# Patient Record
Sex: FEMALE | Race: WHITE | NOT HISPANIC OR LATINO
[De-identification: names, ages, dates, MRNs, and addresses within clinical notes are randomized per-mention and may not be internally consistent; named-entity substitution may affect disease eponyms.]

---

## 2021-08-02 ENCOUNTER — NON-APPOINTMENT (OUTPATIENT)
Age: 31
End: 2021-08-02

## 2021-08-31 ENCOUNTER — APPOINTMENT (OUTPATIENT)
Dept: OBGYN | Facility: CLINIC | Age: 31
End: 2021-08-31
Payer: COMMERCIAL

## 2021-08-31 VITALS
BODY MASS INDEX: 23.04 KG/M2 | SYSTOLIC BLOOD PRESSURE: 90 MMHG | DIASTOLIC BLOOD PRESSURE: 60 MMHG | WEIGHT: 130 LBS | HEIGHT: 63 IN

## 2021-08-31 DIAGNOSIS — Z83.3 FAMILY HISTORY OF DIABETES MELLITUS: ICD-10-CM

## 2021-08-31 DIAGNOSIS — Z00.00 ENCOUNTER FOR GENERAL ADULT MEDICAL EXAMINATION W/OUT ABNORMAL FINDINGS: ICD-10-CM

## 2021-08-31 DIAGNOSIS — Z80.49 FAMILY HISTORY OF MALIGNANT NEOPLASM OF OTHER GENITAL ORGANS: ICD-10-CM

## 2021-08-31 DIAGNOSIS — Z78.9 OTHER SPECIFIED HEALTH STATUS: ICD-10-CM

## 2021-08-31 PROCEDURE — 99213 OFFICE O/P EST LOW 20 MIN: CPT | Mod: 25

## 2021-08-31 PROCEDURE — 99385 PREV VISIT NEW AGE 18-39: CPT | Mod: 25

## 2021-08-31 PROCEDURE — 76830 TRANSVAGINAL US NON-OB: CPT

## 2021-09-02 LAB
C TRACH RRNA SPEC QL NAA+PROBE: NOT DETECTED
N GONORRHOEA RRNA SPEC QL NAA+PROBE: NOT DETECTED
SOURCE TP AMPLIFICATION: NORMAL

## 2021-09-07 LAB — CYTOLOGY CVX/VAG DOC THIN PREP: NORMAL

## 2021-09-20 ENCOUNTER — APPOINTMENT (OUTPATIENT)
Dept: OBGYN | Facility: CLINIC | Age: 31
End: 2021-09-20

## 2021-09-21 ENCOUNTER — TRANSCRIPTION ENCOUNTER (OUTPATIENT)
Age: 31
End: 2021-09-21

## 2021-09-21 ENCOUNTER — ASOB RESULT (OUTPATIENT)
Age: 31
End: 2021-09-21

## 2021-09-21 ENCOUNTER — APPOINTMENT (OUTPATIENT)
Dept: ANTEPARTUM | Facility: CLINIC | Age: 31
End: 2021-09-21
Payer: COMMERCIAL

## 2021-09-21 PROCEDURE — 36416 COLLJ CAPILLARY BLOOD SPEC: CPT

## 2021-09-21 PROCEDURE — 76813 OB US NUCHAL MEAS 1 GEST: CPT

## 2021-09-21 PROCEDURE — 76801 OB US < 14 WKS SINGLE FETUS: CPT

## 2021-09-23 LAB
BASOPHILS # BLD AUTO: 0.01 K/UL
BASOPHILS NFR BLD AUTO: 0.1 %
EOSINOPHIL # BLD AUTO: 0.04 K/UL
EOSINOPHIL NFR BLD AUTO: 0.6 %
HCT VFR BLD CALC: 35.9 %
HGB BLD-MCNC: 12 G/DL
IMM GRANULOCYTES NFR BLD AUTO: 0.4 %
LYMPHOCYTES # BLD AUTO: 1.67 K/UL
LYMPHOCYTES NFR BLD AUTO: 24.1 %
MAN DIFF?: NORMAL
MCHC RBC-ENTMCNC: 27.8 PG
MCHC RBC-ENTMCNC: 33.4 GM/DL
MCV RBC AUTO: 83.3 FL
MONOCYTES # BLD AUTO: 0.42 K/UL
MONOCYTES NFR BLD AUTO: 6.1 %
NEUTROPHILS # BLD AUTO: 4.76 K/UL
NEUTROPHILS NFR BLD AUTO: 68.7 %
PLATELET # BLD AUTO: 221 K/UL
RBC # BLD: 4.31 M/UL
RBC # FLD: 12.5 %
TSH SERPL-ACNC: 1.28 UIU/ML
WBC # FLD AUTO: 6.93 K/UL

## 2021-09-24 LAB
B19V IGG SER QL IA: 7.96 INDEX
B19V IGG+IGM SER-IMP: NORMAL
B19V IGG+IGM SER-IMP: POSITIVE
B19V IGM FLD-ACNC: 0.25 INDEX
B19V IGM SER-ACNC: NEGATIVE
GESTATIONAL GLUCOSE 1 HOUR (ADA): 102 MG/DL
GESTATIONAL GLUCOSE 2 HOUR (ADA): 86 MG/DL
GESTATIONAL GLUCOSE FASTING (ADA): 74 MG/DL
HBV SURFACE AG SER QL: NONREACTIVE
HGB A MFR BLD: 97.1 %
HGB A2 MFR BLD: 2.9 %
HGB FRACT BLD-IMP: NORMAL
HIV1+2 AB SPEC QL IA.RAPID: NONREACTIVE
MEV IGG FLD QL IA: 5.1 AU/ML
MEV IGG+IGM SER-IMP: NEGATIVE
RUBV IGG FLD-ACNC: 2.1 INDEX
RUBV IGG SER-IMP: POSITIVE
T GONDII AB SER-IMP: NEGATIVE
T GONDII AB SER-IMP: NEGATIVE
T GONDII IGG SER QL: <3 IU/ML
T GONDII IGM SER QL: <3 AU/ML
VZV AB TITR SER: NEGATIVE
VZV IGG SER IF-ACNC: 126.2 INDEX

## 2021-09-27 PROBLEM — Z80.49 FAMILY HISTORY OF MALIGNANT NEOPLASM OF ENDOMETRIUM: Status: ACTIVE | Noted: 2021-09-27

## 2021-09-27 PROBLEM — Z78.9 SOCIAL ALCOHOL USE: Status: ACTIVE | Noted: 2021-09-27

## 2021-09-27 PROBLEM — Z83.3 FAMILY HISTORY OF DIABETES MELLITUS: Status: ACTIVE | Noted: 2021-09-27

## 2021-09-28 LAB — T PALLIDUM AB SER QL IA: NEGATIVE

## 2021-09-30 LAB
AR GENE MUT ANL BLD/T: NORMAL
FMR1 GENE MUT ANL BLD/T: NORMAL

## 2021-10-04 ENCOUNTER — APPOINTMENT (OUTPATIENT)
Dept: OBGYN | Facility: CLINIC | Age: 31
End: 2021-10-04
Payer: COMMERCIAL

## 2021-10-04 VITALS
DIASTOLIC BLOOD PRESSURE: 66 MMHG | WEIGHT: 127 LBS | SYSTOLIC BLOOD PRESSURE: 110 MMHG | HEIGHT: 63 IN | BODY MASS INDEX: 22.5 KG/M2

## 2021-10-04 LAB — CFTR MUT TESTED BLD/T: NEGATIVE

## 2021-10-04 PROCEDURE — 90656 IIV3 VACC NO PRSV 0.5 ML IM: CPT

## 2021-10-04 PROCEDURE — 0502F SUBSEQUENT PRENATAL CARE: CPT

## 2021-10-04 PROCEDURE — 90471 IMMUNIZATION ADMIN: CPT

## 2021-10-05 LAB
BILIRUB UR QL STRIP: NORMAL
GLUCOSE UR-MCNC: NORMAL
HCG UR QL: 0.2 EU/DL
HGB UR QL STRIP.AUTO: NORMAL
KETONES UR-MCNC: NORMAL
LEUKOCYTE ESTERASE UR QL STRIP: NORMAL
NITRITE UR QL STRIP: NORMAL
PH UR STRIP: 6
PROT UR STRIP-MCNC: NORMAL
SP GR UR STRIP: 1.01

## 2021-10-11 LAB — ABO + RH PNL BLD: NORMAL

## 2021-11-01 ENCOUNTER — APPOINTMENT (OUTPATIENT)
Dept: OBGYN | Facility: CLINIC | Age: 31
End: 2021-11-01
Payer: COMMERCIAL

## 2021-11-01 VITALS
HEIGHT: 63 IN | DIASTOLIC BLOOD PRESSURE: 56 MMHG | WEIGHT: 127 LBS | SYSTOLIC BLOOD PRESSURE: 100 MMHG | BODY MASS INDEX: 22.5 KG/M2

## 2021-11-01 PROCEDURE — 0502F SUBSEQUENT PRENATAL CARE: CPT

## 2021-11-01 PROCEDURE — 36415 COLL VENOUS BLD VENIPUNCTURE: CPT

## 2021-11-02 LAB
BILIRUB UR QL STRIP: NORMAL
BLD GP AB SCN SERPL QL: NORMAL
GLUCOSE UR-MCNC: NORMAL
HCG UR QL: 0.2 EU/DL
HGB UR QL STRIP.AUTO: NORMAL
KETONES UR-MCNC: NORMAL
LEUKOCYTE ESTERASE UR QL STRIP: NORMAL
NITRITE UR QL STRIP: NORMAL
PH UR STRIP: 6.5
PROT UR STRIP-MCNC: NORMAL
SP GR UR STRIP: 1.01

## 2021-11-11 LAB
2ND TRIMESTER DATA: NORMAL
ADDENDUM DOC: NORMAL
AFP PNL SERPL: NORMAL
AFP SERPL-ACNC: NORMAL
CLINICAL BIOCHEMIST REVIEW: NORMAL
Lab: NORMAL
NOTES NTD: NORMAL

## 2021-11-16 ENCOUNTER — APPOINTMENT (OUTPATIENT)
Dept: ANTEPARTUM | Facility: CLINIC | Age: 31
End: 2021-11-16
Payer: COMMERCIAL

## 2021-11-16 ENCOUNTER — ASOB RESULT (OUTPATIENT)
Age: 31
End: 2021-11-16

## 2021-11-16 PROCEDURE — 76811 OB US DETAILED SNGL FETUS: CPT

## 2021-11-16 PROCEDURE — 99212 OFFICE O/P EST SF 10 MIN: CPT | Mod: 25

## 2021-11-22 ENCOUNTER — APPOINTMENT (OUTPATIENT)
Dept: PEDIATRIC CARDIOLOGY | Facility: CLINIC | Age: 31
End: 2021-11-22
Payer: COMMERCIAL

## 2021-11-22 PROCEDURE — 99202 OFFICE O/P NEW SF 15 MIN: CPT

## 2021-11-22 PROCEDURE — 76827 ECHO EXAM OF FETAL HEART: CPT

## 2021-11-22 PROCEDURE — 76825 ECHO EXAM OF FETAL HEART: CPT

## 2021-11-22 PROCEDURE — 93325 DOPPLER ECHO COLOR FLOW MAPG: CPT

## 2021-11-29 ENCOUNTER — APPOINTMENT (OUTPATIENT)
Dept: OBGYN | Facility: CLINIC | Age: 31
End: 2021-11-29
Payer: COMMERCIAL

## 2021-11-29 VITALS
HEIGHT: 63 IN | BODY MASS INDEX: 24.1 KG/M2 | SYSTOLIC BLOOD PRESSURE: 100 MMHG | WEIGHT: 136 LBS | DIASTOLIC BLOOD PRESSURE: 60 MMHG

## 2021-11-29 LAB
BILIRUB UR QL STRIP: NORMAL
GLUCOSE UR-MCNC: NORMAL
HCG UR QL: 0.2 EU/DL
HGB UR QL STRIP.AUTO: NORMAL
KETONES UR-MCNC: NORMAL
LEUKOCYTE ESTERASE UR QL STRIP: NORMAL
NITRITE UR QL STRIP: NORMAL
PH UR STRIP: 6.5
PROT UR STRIP-MCNC: NORMAL
SP GR UR STRIP: 1.01

## 2021-11-29 PROCEDURE — 0502F SUBSEQUENT PRENATAL CARE: CPT

## 2021-12-27 ENCOUNTER — APPOINTMENT (OUTPATIENT)
Dept: OBGYN | Facility: CLINIC | Age: 31
End: 2021-12-27
Payer: COMMERCIAL

## 2021-12-27 VITALS
BODY MASS INDEX: 24.8 KG/M2 | WEIGHT: 140 LBS | SYSTOLIC BLOOD PRESSURE: 100 MMHG | DIASTOLIC BLOOD PRESSURE: 56 MMHG | HEIGHT: 63 IN

## 2021-12-27 PROCEDURE — 0502F SUBSEQUENT PRENATAL CARE: CPT

## 2022-01-11 ENCOUNTER — APPOINTMENT (OUTPATIENT)
Dept: ANTEPARTUM | Facility: CLINIC | Age: 32
End: 2022-01-11
Payer: COMMERCIAL

## 2022-01-11 ENCOUNTER — ASOB RESULT (OUTPATIENT)
Age: 32
End: 2022-01-11

## 2022-01-11 PROCEDURE — 76817 TRANSVAGINAL US OBSTETRIC: CPT

## 2022-01-11 PROCEDURE — 76816 OB US FOLLOW-UP PER FETUS: CPT

## 2022-01-11 PROCEDURE — 76819 FETAL BIOPHYS PROFIL W/O NST: CPT

## 2022-01-13 LAB
BASOPHILS # BLD AUTO: 0.03 K/UL
BASOPHILS NFR BLD AUTO: 0.2 %
EOSINOPHIL # BLD AUTO: 0.13 K/UL
EOSINOPHIL NFR BLD AUTO: 1.1 %
HCT VFR BLD CALC: 35.3 %
HGB BLD-MCNC: 11.1 G/DL
IMM GRANULOCYTES NFR BLD AUTO: 1.6 %
LYMPHOCYTES # BLD AUTO: 1.92 K/UL
LYMPHOCYTES NFR BLD AUTO: 16 %
MAN DIFF?: NORMAL
MCHC RBC-ENTMCNC: 29.8 PG
MCHC RBC-ENTMCNC: 31.4 GM/DL
MCV RBC AUTO: 94.6 FL
MONOCYTES # BLD AUTO: 1.05 K/UL
MONOCYTES NFR BLD AUTO: 8.7 %
NEUTROPHILS # BLD AUTO: 8.71 K/UL
NEUTROPHILS NFR BLD AUTO: 72.4 %
PLATELET # BLD AUTO: 187 K/UL
RBC # BLD: 3.73 M/UL
RBC # FLD: 13 %
WBC # FLD AUTO: 12.03 K/UL

## 2022-01-16 LAB
GESTATIONAL GLUCOSE 1 HOUR (ADA): 140 MG/DL
GESTATIONAL GLUCOSE 2 HOUR (ADA): 133 MG/DL
GESTATIONAL GLUCOSE FASTING (ADA): 71 MG/DL

## 2022-01-24 ENCOUNTER — APPOINTMENT (OUTPATIENT)
Dept: OBGYN | Facility: CLINIC | Age: 32
End: 2022-01-24
Payer: COMMERCIAL

## 2022-01-24 VITALS
WEIGHT: 149 LBS | SYSTOLIC BLOOD PRESSURE: 110 MMHG | DIASTOLIC BLOOD PRESSURE: 64 MMHG | BODY MASS INDEX: 26.4 KG/M2 | HEIGHT: 63 IN

## 2022-01-24 PROCEDURE — 0502F SUBSEQUENT PRENATAL CARE: CPT

## 2022-01-25 LAB
BILIRUB UR QL STRIP: NORMAL
GLUCOSE UR-MCNC: NORMAL
HCG UR QL: 0.2 EU/DL
HGB UR QL STRIP.AUTO: NORMAL
KETONES UR-MCNC: NORMAL
LEUKOCYTE ESTERASE UR QL STRIP: NORMAL
NITRITE UR QL STRIP: NORMAL
PH UR STRIP: 6
PROT UR STRIP-MCNC: NORMAL
SP GR UR STRIP: 1.03

## 2022-02-07 ENCOUNTER — APPOINTMENT (OUTPATIENT)
Dept: OBGYN | Facility: CLINIC | Age: 32
End: 2022-02-07
Payer: COMMERCIAL

## 2022-02-07 VITALS
SYSTOLIC BLOOD PRESSURE: 116 MMHG | DIASTOLIC BLOOD PRESSURE: 64 MMHG | BODY MASS INDEX: 27.29 KG/M2 | WEIGHT: 154 LBS | HEIGHT: 63 IN

## 2022-02-07 PROCEDURE — 0502F SUBSEQUENT PRENATAL CARE: CPT

## 2022-02-08 ENCOUNTER — ASOB RESULT (OUTPATIENT)
Age: 32
End: 2022-02-08

## 2022-02-08 ENCOUNTER — APPOINTMENT (OUTPATIENT)
Dept: ANTEPARTUM | Facility: CLINIC | Age: 32
End: 2022-02-08
Payer: COMMERCIAL

## 2022-02-08 PROCEDURE — 76817 TRANSVAGINAL US OBSTETRIC: CPT

## 2022-02-08 PROCEDURE — 76819 FETAL BIOPHYS PROFIL W/O NST: CPT

## 2022-02-08 PROCEDURE — 76816 OB US FOLLOW-UP PER FETUS: CPT

## 2022-02-28 ENCOUNTER — APPOINTMENT (OUTPATIENT)
Dept: OBGYN | Facility: CLINIC | Age: 32
End: 2022-02-28
Payer: COMMERCIAL

## 2022-02-28 VITALS
HEIGHT: 63 IN | BODY MASS INDEX: 29.23 KG/M2 | DIASTOLIC BLOOD PRESSURE: 80 MMHG | WEIGHT: 165 LBS | SYSTOLIC BLOOD PRESSURE: 120 MMHG

## 2022-02-28 PROCEDURE — 0502F SUBSEQUENT PRENATAL CARE: CPT

## 2022-03-01 ENCOUNTER — APPOINTMENT (OUTPATIENT)
Dept: ANTEPARTUM | Facility: CLINIC | Age: 32
End: 2022-03-01
Payer: COMMERCIAL

## 2022-03-01 ENCOUNTER — ASOB RESULT (OUTPATIENT)
Age: 32
End: 2022-03-01

## 2022-03-01 PROCEDURE — 76817 TRANSVAGINAL US OBSTETRIC: CPT

## 2022-03-01 PROCEDURE — 76816 OB US FOLLOW-UP PER FETUS: CPT

## 2022-03-01 PROCEDURE — 76819 FETAL BIOPHYS PROFIL W/O NST: CPT

## 2022-03-06 LAB
BILIRUB UR QL STRIP: NORMAL
GLUCOSE UR-MCNC: NORMAL
HCG UR QL: 0.2 EU/DL
HGB UR QL STRIP.AUTO: NORMAL
KETONES UR-MCNC: NORMAL
LEUKOCYTE ESTERASE UR QL STRIP: NORMAL
NITRITE UR QL STRIP: NORMAL
PH UR STRIP: 6
PROT UR STRIP-MCNC: NORMAL
SP GR UR STRIP: 1.02

## 2022-03-14 ENCOUNTER — LABORATORY RESULT (OUTPATIENT)
Age: 32
End: 2022-03-14

## 2022-03-14 ENCOUNTER — APPOINTMENT (OUTPATIENT)
Dept: OBGYN | Facility: CLINIC | Age: 32
End: 2022-03-14
Payer: COMMERCIAL

## 2022-03-14 VITALS
BODY MASS INDEX: 29.77 KG/M2 | HEIGHT: 63 IN | WEIGHT: 168 LBS | SYSTOLIC BLOOD PRESSURE: 130 MMHG | DIASTOLIC BLOOD PRESSURE: 80 MMHG

## 2022-03-14 DIAGNOSIS — Z23 ENCOUNTER FOR IMMUNIZATION: ICD-10-CM

## 2022-03-14 PROCEDURE — 90471 IMMUNIZATION ADMIN: CPT

## 2022-03-14 PROCEDURE — 90715 TDAP VACCINE 7 YRS/> IM: CPT

## 2022-03-14 PROCEDURE — 0502F SUBSEQUENT PRENATAL CARE: CPT

## 2022-03-15 ENCOUNTER — APPOINTMENT (OUTPATIENT)
Dept: OBGYN | Facility: CLINIC | Age: 32
End: 2022-03-15
Payer: COMMERCIAL

## 2022-03-15 PROCEDURE — 0502F SUBSEQUENT PRENATAL CARE: CPT

## 2022-03-15 PROCEDURE — 36415 COLL VENOUS BLD VENIPUNCTURE: CPT

## 2022-03-15 RX ORDER — BLOOD-GLUCOSE METER
KIT MISCELLANEOUS
Qty: 1 | Refills: 0 | Status: ACTIVE | COMMUNITY
Start: 2022-03-15 | End: 1900-01-01

## 2022-03-16 ENCOUNTER — INPATIENT (INPATIENT)
Facility: HOSPITAL | Age: 32
LOS: 5 days | Discharge: HOME | End: 2022-03-22
Attending: OBSTETRICS & GYNECOLOGY | Admitting: OBSTETRICS & GYNECOLOGY
Payer: COMMERCIAL

## 2022-03-16 VITALS
SYSTOLIC BLOOD PRESSURE: 143 MMHG | HEART RATE: 89 BPM | DIASTOLIC BLOOD PRESSURE: 92 MMHG | RESPIRATION RATE: 16 BRPM | TEMPERATURE: 99 F

## 2022-03-16 DIAGNOSIS — Z98.890 OTHER SPECIFIED POSTPROCEDURAL STATES: Chronic | ICD-10-CM

## 2022-03-16 DIAGNOSIS — O16.9 UNSPECIFIED MATERNAL HYPERTENSION, UNSPECIFIED TRIMESTER: ICD-10-CM

## 2022-03-16 LAB
ALBUMIN SERPL ELPH-MCNC: 3 G/DL — LOW (ref 3.3–5)
ALP SERPL-CCNC: 189 U/L — HIGH (ref 40–120)
ALT FLD-CCNC: 23 U/L — SIGNIFICANT CHANGE UP (ref 4–33)
ANION GAP SERPL CALC-SCNC: 12 MMOL/L — SIGNIFICANT CHANGE UP (ref 7–14)
APPEARANCE UR: ABNORMAL
APTT BLD: 25.7 SEC — LOW (ref 27–36.3)
AST SERPL-CCNC: 30 U/L — SIGNIFICANT CHANGE UP (ref 4–32)
BACTERIA # UR AUTO: ABNORMAL
BASOPHILS # BLD AUTO: 0.02 K/UL — SIGNIFICANT CHANGE UP (ref 0–0.2)
BASOPHILS NFR BLD AUTO: 0.2 % — SIGNIFICANT CHANGE UP (ref 0–2)
BILIRUB SERPL-MCNC: <0.2 MG/DL — SIGNIFICANT CHANGE UP (ref 0.2–1.2)
BILIRUB UR-MCNC: NEGATIVE — SIGNIFICANT CHANGE UP
BLD GP AB SCN SERPL QL: NEGATIVE — SIGNIFICANT CHANGE UP
BUN SERPL-MCNC: 15 MG/DL — SIGNIFICANT CHANGE UP (ref 7–23)
CALCIUM SERPL-MCNC: 9.5 MG/DL — SIGNIFICANT CHANGE UP (ref 8.4–10.5)
CHLORIDE SERPL-SCNC: 108 MMOL/L — HIGH (ref 98–107)
CO2 SERPL-SCNC: 19 MMOL/L — LOW (ref 22–31)
COLOR SPEC: SIGNIFICANT CHANGE UP
CREAT ?TM UR-MCNC: 59 MG/DL — SIGNIFICANT CHANGE UP
CREAT SERPL-MCNC: 0.74 MG/DL — SIGNIFICANT CHANGE UP (ref 0.5–1.3)
DIFF PNL FLD: ABNORMAL
EGFR: 111 ML/MIN/1.73M2 — SIGNIFICANT CHANGE UP
EOSINOPHIL # BLD AUTO: 0.28 K/UL — SIGNIFICANT CHANGE UP (ref 0–0.5)
EOSINOPHIL NFR BLD AUTO: 3.2 % — SIGNIFICANT CHANGE UP (ref 0–6)
EPI CELLS # UR: 2 /HPF — SIGNIFICANT CHANGE UP (ref 0–5)
FIBRINOGEN PPP-MCNC: 562 MG/DL — HIGH (ref 330–520)
GLUCOSE SERPL-MCNC: 79 MG/DL — SIGNIFICANT CHANGE UP (ref 70–99)
GLUCOSE UR QL: NEGATIVE — SIGNIFICANT CHANGE UP
HCT VFR BLD CALC: 30.4 % — LOW (ref 34.5–45)
HGB BLD-MCNC: 9.8 G/DL — LOW (ref 11.5–15.5)
HYALINE CASTS # UR AUTO: 2 /LPF — SIGNIFICANT CHANGE UP (ref 0–7)
IANC: 6.44 K/UL — SIGNIFICANT CHANGE UP (ref 1.5–8.5)
IMM GRANULOCYTES NFR BLD AUTO: 0.9 % — SIGNIFICANT CHANGE UP (ref 0–1.5)
INR BLD: 0.92 RATIO — SIGNIFICANT CHANGE UP (ref 0.88–1.16)
KETONES UR-MCNC: NEGATIVE — SIGNIFICANT CHANGE UP
LDH SERPL L TO P-CCNC: 223 U/L — SIGNIFICANT CHANGE UP (ref 135–225)
LEUKOCYTE ESTERASE UR-ACNC: NEGATIVE — SIGNIFICANT CHANGE UP
LYMPHOCYTES # BLD AUTO: 1.26 K/UL — SIGNIFICANT CHANGE UP (ref 1–3.3)
LYMPHOCYTES # BLD AUTO: 14.2 % — SIGNIFICANT CHANGE UP (ref 13–44)
MCHC RBC-ENTMCNC: 28.1 PG — SIGNIFICANT CHANGE UP (ref 27–34)
MCHC RBC-ENTMCNC: 32.2 GM/DL — SIGNIFICANT CHANGE UP (ref 32–36)
MCV RBC AUTO: 87.1 FL — SIGNIFICANT CHANGE UP (ref 80–100)
MONOCYTES # BLD AUTO: 0.77 K/UL — SIGNIFICANT CHANGE UP (ref 0–0.9)
MONOCYTES NFR BLD AUTO: 8.7 % — SIGNIFICANT CHANGE UP (ref 2–14)
NEUTROPHILS # BLD AUTO: 6.44 K/UL — SIGNIFICANT CHANGE UP (ref 1.8–7.4)
NEUTROPHILS NFR BLD AUTO: 72.8 % — SIGNIFICANT CHANGE UP (ref 43–77)
NITRITE UR-MCNC: NEGATIVE — SIGNIFICANT CHANGE UP
NRBC # BLD: 0 /100 WBCS — SIGNIFICANT CHANGE UP
NRBC # FLD: 0.02 K/UL — HIGH
PH UR: 6 — SIGNIFICANT CHANGE UP (ref 5–8)
PLATELET # BLD AUTO: 143 K/UL — LOW (ref 150–400)
POTASSIUM SERPL-MCNC: 4.3 MMOL/L — SIGNIFICANT CHANGE UP (ref 3.5–5.3)
POTASSIUM SERPL-SCNC: 4.3 MMOL/L — SIGNIFICANT CHANGE UP (ref 3.5–5.3)
PROT ?TM UR-MCNC: 93 MG/DL — SIGNIFICANT CHANGE UP
PROT ?TM UR-MCNC: 93 MG/DL — SIGNIFICANT CHANGE UP
PROT SERPL-MCNC: 5.7 G/DL — LOW (ref 6–8.3)
PROT UR-MCNC: ABNORMAL
PROT/CREAT UR-RTO: 1.6 RATIO — HIGH (ref 0–0.2)
PROTHROM AB SERPL-ACNC: 10.7 SEC — SIGNIFICANT CHANGE UP (ref 10.5–13.4)
RBC # BLD: 3.49 M/UL — LOW (ref 3.8–5.2)
RBC # FLD: 12.1 % — SIGNIFICANT CHANGE UP (ref 10.3–14.5)
RBC CASTS # UR COMP ASSIST: 3 /HPF — SIGNIFICANT CHANGE UP (ref 0–4)
RH IG SCN BLD-IMP: POSITIVE — SIGNIFICANT CHANGE UP
RH IG SCN BLD-IMP: POSITIVE — SIGNIFICANT CHANGE UP
SODIUM SERPL-SCNC: 139 MMOL/L — SIGNIFICANT CHANGE UP (ref 135–145)
SP GR SPEC: 1.01 — SIGNIFICANT CHANGE UP (ref 1–1.05)
URATE SERPL-MCNC: 8.3 MG/DL — HIGH (ref 2.5–7)
UROBILINOGEN FLD QL: SIGNIFICANT CHANGE UP
WBC # BLD: 8.85 K/UL — SIGNIFICANT CHANGE UP (ref 3.8–10.5)
WBC # FLD AUTO: 8.85 K/UL — SIGNIFICANT CHANGE UP (ref 3.8–10.5)
WBC UR QL: 21 /HPF — HIGH (ref 0–5)

## 2022-03-16 RX ORDER — OXYTOCIN 10 UNIT/ML
VIAL (ML) INJECTION
Qty: 20 | Refills: 0 | Status: DISCONTINUED | OUTPATIENT
Start: 2022-03-16 | End: 2022-03-17

## 2022-03-16 RX ORDER — SODIUM CHLORIDE 9 MG/ML
1000 INJECTION, SOLUTION INTRAVENOUS
Refills: 0 | Status: DISCONTINUED | OUTPATIENT
Start: 2022-03-16 | End: 2022-03-17

## 2022-03-16 NOTE — OB PROVIDER H&P - ASSESSMENT
Evidence of gestational hypertension, discussed findings with Dr. Salas  -Admit to L&D  -Routine and HELLP labs   -Induce with PO Cytotec

## 2022-03-16 NOTE — OB RN PATIENT PROFILE - PAIN RATING/NUMBER SCALE (0-10): REST
----- Message from Trang Bradley sent at 2020  9:44 AM CDT -----  [address]  Tavares Zimmer  1953  HOME: [phone]   WORK: N/A   CELL: [phone]       Patient is scheduled for Labs 20  Lab orders needed: NEED LAB ORDERS    Please insure lab orders will be available by the date of service.         0

## 2022-03-16 NOTE — OB RN PATIENT PROFILE - FALL HARM RISK - UNIVERSAL INTERVENTIONS
Bed in lowest position, wheels locked, appropriate side rails in place/Call bell, personal items and telephone in reach/Instruct patient to call for assistance before getting out of bed or chair/Non-slip footwear when patient is out of bed/[place] to call system/Physically safe environment - no spills, clutter or unnecessary equipment/Purposeful Proactive Rounding/Room/bathroom lighting operational, light cord in reach

## 2022-03-16 NOTE — OB RN TRIAGE NOTE - NSMATERNALFETALCONCERNS_OBGYN_ALL_OB_FT
MATERNAL FETAL ALERT  Posterior placenta is PREVIA 11/16/2021  minimal pericardial effusion on fetal echo   Kellie Nicholson RN 11/26/2021

## 2022-03-16 NOTE — OB PROVIDER H&P - HISTORY OF PRESENT ILLNESS
Pt. is a 32y/o EGA 37.2wks reports of elevated blood pressures yesterday (157/93 and 143/89) and today (139/95 and 149/96). Pt. denies headache, visual changes, epigastric/RUQ pain, N/V, abdominal cramping, leakage of fluid and vaginal bleeding. Pt. reports of good fetal movement.     AP: Low lying placenta. 2.8cm away from the internal os - Resolved (3/1/22). Mild pericardial effusion - Resolved   Medical Hx: Denies  Surgical HX: Two ganglion cyst removal 2002 and 2006  OBGYN HX: Denies  Meds: PNV  NKDA Pt. is a 32y/o EGA 37.2wks reports of elevated blood pressures yesterday (157/93 and 143/89) and today (139/95 and 149/96). Pt. denies headache, visual changes, epigastric/RUQ pain, N/V, abdominal cramping, leakage of fluid and vaginal bleeding. Pt. reports of good fetal movement.     COVID Pos. 2/8/22    AP: Low lying placenta. 2.8cm away from the internal os - Resolved (3/1/22). Mild pericardial effusion - Resolved   Medical Hx: Denies  Surgical HX: Two ganglion cyst removal 2002 and 2006  OBGYN HX: Denies  Meds: PNV  NKDA

## 2022-03-16 NOTE — OB PROVIDER H&P - NSHPPHYSICALEXAM_GEN_ALL_CORE
Abdomen soft nontender  SVE: 0/0/-3  TAS: Cephalic presentation  GBS: Neg. (3/14/22)  TAS: Cephalic presentation   EFW: 3000gms by leopolds   FHR: 145bpm, moderate variability, accels, no decels  Rose irregularly

## 2022-03-16 NOTE — OB RN TRIAGE NOTE - FALL HARM RISK - UNIVERSAL INTERVENTIONS
Bed in lowest position, wheels locked, appropriate side rails in place/Call bell, personal items and telephone in reach/Instruct patient to call for assistance before getting out of bed or chair/Non-slip footwear when patient is out of bed/[place] to call system/Physically safe environment - no spills, clutter or unnecessary equipment/Purposeful Proactive Rounding/Room/bathroom lighting operational, light cord in reach

## 2022-03-17 ENCOUNTER — TRANSCRIPTION ENCOUNTER (OUTPATIENT)
Age: 32
End: 2022-03-17

## 2022-03-17 LAB
ALBUMIN SERPL ELPH-MCNC: 2.9 G/DL — LOW (ref 3.3–5)
ALP SERPL-CCNC: 187 U/L — HIGH (ref 40–120)
ALT FLD-CCNC: 25 U/L — SIGNIFICANT CHANGE UP (ref 4–33)
ANION GAP SERPL CALC-SCNC: 12 MMOL/L — SIGNIFICANT CHANGE UP (ref 7–14)
APTT BLD: 25.1 SEC — LOW (ref 27–36.3)
APTT BLD: 25.3 SEC — LOW (ref 27–36.3)
AST SERPL-CCNC: 27 U/L — SIGNIFICANT CHANGE UP (ref 4–32)
BASOPHILS # BLD AUTO: 0.03 K/UL — SIGNIFICANT CHANGE UP (ref 0–0.2)
BASOPHILS # BLD AUTO: 0.03 K/UL — SIGNIFICANT CHANGE UP (ref 0–0.2)
BASOPHILS NFR BLD AUTO: 0.3 % — SIGNIFICANT CHANGE UP (ref 0–2)
BASOPHILS NFR BLD AUTO: 0.4 % — SIGNIFICANT CHANGE UP (ref 0–2)
BILIRUB SERPL-MCNC: <0.2 MG/DL — SIGNIFICANT CHANGE UP (ref 0.2–1.2)
BUN SERPL-MCNC: 15 MG/DL — SIGNIFICANT CHANGE UP (ref 7–23)
CALCIUM SERPL-MCNC: 8.8 MG/DL — SIGNIFICANT CHANGE UP (ref 8.4–10.5)
CHLORIDE SERPL-SCNC: 106 MMOL/L — SIGNIFICANT CHANGE UP (ref 98–107)
CO2 SERPL-SCNC: 18 MMOL/L — LOW (ref 22–31)
COVID-19 SPIKE DOMAIN AB INTERP: POSITIVE
COVID-19 SPIKE DOMAIN ANTIBODY RESULT: >250 U/ML — HIGH
CREAT SERPL-MCNC: 0.81 MG/DL — SIGNIFICANT CHANGE UP (ref 0.5–1.3)
EGFR: 99 ML/MIN/1.73M2 — SIGNIFICANT CHANGE UP
EOSINOPHIL # BLD AUTO: 0.19 K/UL — SIGNIFICANT CHANGE UP (ref 0–0.5)
EOSINOPHIL # BLD AUTO: 0.32 K/UL — SIGNIFICANT CHANGE UP (ref 0–0.5)
EOSINOPHIL NFR BLD AUTO: 1.7 % — SIGNIFICANT CHANGE UP (ref 0–6)
EOSINOPHIL NFR BLD AUTO: 3.9 % — SIGNIFICANT CHANGE UP (ref 0–6)
GLUCOSE SERPL-MCNC: 72 MG/DL — SIGNIFICANT CHANGE UP (ref 70–99)
HCT VFR BLD CALC: 30 % — LOW (ref 34.5–45)
HCT VFR BLD CALC: 30.2 % — LOW (ref 34.5–45)
HGB BLD-MCNC: 10.1 G/DL — LOW (ref 11.5–15.5)
HGB BLD-MCNC: 9.8 G/DL — LOW (ref 11.5–15.5)
IANC: 5.83 K/UL — SIGNIFICANT CHANGE UP (ref 1.5–8.5)
IANC: 8.72 K/UL — HIGH (ref 1.5–8.5)
IMM GRANULOCYTES NFR BLD AUTO: 0.9 % — SIGNIFICANT CHANGE UP (ref 0–1.5)
IMM GRANULOCYTES NFR BLD AUTO: 0.9 % — SIGNIFICANT CHANGE UP (ref 0–1.5)
LDH SERPL L TO P-CCNC: 202 U/L — SIGNIFICANT CHANGE UP (ref 135–225)
LYMPHOCYTES # BLD AUTO: 1.34 K/UL — SIGNIFICANT CHANGE UP (ref 1–3.3)
LYMPHOCYTES # BLD AUTO: 1.4 K/UL — SIGNIFICANT CHANGE UP (ref 1–3.3)
LYMPHOCYTES # BLD AUTO: 12.2 % — LOW (ref 13–44)
LYMPHOCYTES # BLD AUTO: 16.4 % — SIGNIFICANT CHANGE UP (ref 13–44)
MCHC RBC-ENTMCNC: 28 PG — SIGNIFICANT CHANGE UP (ref 27–34)
MCHC RBC-ENTMCNC: 28.8 PG — SIGNIFICANT CHANGE UP (ref 27–34)
MCHC RBC-ENTMCNC: 32.5 GM/DL — SIGNIFICANT CHANGE UP (ref 32–36)
MCHC RBC-ENTMCNC: 33.7 GM/DL — SIGNIFICANT CHANGE UP (ref 32–36)
MCV RBC AUTO: 85.5 FL — SIGNIFICANT CHANGE UP (ref 80–100)
MCV RBC AUTO: 86.3 FL — SIGNIFICANT CHANGE UP (ref 80–100)
MONOCYTES # BLD AUTO: 0.6 K/UL — SIGNIFICANT CHANGE UP (ref 0–0.9)
MONOCYTES # BLD AUTO: 1.05 K/UL — HIGH (ref 0–0.9)
MONOCYTES NFR BLD AUTO: 7.3 % — SIGNIFICANT CHANGE UP (ref 2–14)
MONOCYTES NFR BLD AUTO: 9.1 % — SIGNIFICANT CHANGE UP (ref 2–14)
NEUTROPHILS # BLD AUTO: 5.83 K/UL — SIGNIFICANT CHANGE UP (ref 1.8–7.4)
NEUTROPHILS # BLD AUTO: 8.72 K/UL — HIGH (ref 1.8–7.4)
NEUTROPHILS NFR BLD AUTO: 71.1 % — SIGNIFICANT CHANGE UP (ref 43–77)
NEUTROPHILS NFR BLD AUTO: 75.8 % — SIGNIFICANT CHANGE UP (ref 43–77)
NRBC # BLD: 0 /100 WBCS — SIGNIFICANT CHANGE UP
NRBC # BLD: 0 /100 WBCS — SIGNIFICANT CHANGE UP
NRBC # FLD: 0 K/UL — SIGNIFICANT CHANGE UP
NRBC # FLD: 0 K/UL — SIGNIFICANT CHANGE UP
PLATELET # BLD AUTO: 113 K/UL — LOW (ref 150–400)
PLATELET # BLD AUTO: 120 K/UL — LOW (ref 150–400)
POTASSIUM SERPL-MCNC: 4.4 MMOL/L — SIGNIFICANT CHANGE UP (ref 3.5–5.3)
POTASSIUM SERPL-SCNC: 4.4 MMOL/L — SIGNIFICANT CHANGE UP (ref 3.5–5.3)
PROT SERPL-MCNC: 5.4 G/DL — LOW (ref 6–8.3)
RBC # BLD: 3.5 M/UL — LOW (ref 3.8–5.2)
RBC # BLD: 3.51 M/UL — LOW (ref 3.8–5.2)
RBC # FLD: 12.2 % — SIGNIFICANT CHANGE UP (ref 10.3–14.5)
RBC # FLD: 12.3 % — SIGNIFICANT CHANGE UP (ref 10.3–14.5)
SARS-COV-2 IGG+IGM SERPL QL IA: >250 U/ML — HIGH
SARS-COV-2 IGG+IGM SERPL QL IA: POSITIVE
SODIUM SERPL-SCNC: 136 MMOL/L — SIGNIFICANT CHANGE UP (ref 135–145)
T PALLIDUM AB TITR SER: NEGATIVE — SIGNIFICANT CHANGE UP
URATE SERPL-MCNC: 8.3 MG/DL — HIGH (ref 2.5–7)
URATE SERPL-MCNC: 8.6 MG/DL — HIGH (ref 2.5–7)
WBC # BLD: 11.49 K/UL — HIGH (ref 3.8–10.5)
WBC # BLD: 8.19 K/UL — SIGNIFICANT CHANGE UP (ref 3.8–10.5)
WBC # FLD AUTO: 11.49 K/UL — HIGH (ref 3.8–10.5)
WBC # FLD AUTO: 8.19 K/UL — SIGNIFICANT CHANGE UP (ref 3.8–10.5)

## 2022-03-17 RX ORDER — OXYTOCIN 10 UNIT/ML
VIAL (ML) INJECTION
Qty: 30 | Refills: 0 | Status: DISCONTINUED | OUTPATIENT
Start: 2022-03-17 | End: 2022-03-18

## 2022-03-17 RX ORDER — OXYTOCIN 10 UNIT/ML
333.33 VIAL (ML) INJECTION
Qty: 20 | Refills: 0 | Status: DISCONTINUED | OUTPATIENT
Start: 2022-03-17 | End: 2022-03-22

## 2022-03-17 RX ORDER — SODIUM CHLORIDE 9 MG/ML
1000 INJECTION, SOLUTION INTRAVENOUS
Refills: 0 | Status: DISCONTINUED | OUTPATIENT
Start: 2022-03-17 | End: 2022-03-18

## 2022-03-17 RX ADMIN — SODIUM CHLORIDE 125 MILLILITER(S): 9 INJECTION, SOLUTION INTRAVENOUS at 19:21

## 2022-03-17 RX ADMIN — Medication 2 MILLIUNIT(S)/MIN: at 22:44

## 2022-03-17 NOTE — OB PROVIDER LABOR PROGRESS NOTE - ASSESSMENT
Cervical change noted  Cervical balloon discussed with patient; patient declining at this time, will reevaluate after next dose of cytotec  Continue with PO cytotec  Will reassess PRN    d/w MD Joey Flynn NP

## 2022-03-17 NOTE — OB PROVIDER LABOR PROGRESS NOTE - ASSESSMENT
Cervical change noted  Cervical balloon attempted, patient unable to tolerate at this time  Will reassess and attempt cervical ami Flynn NP Cervical change noted  Cervical balloon attempted, patient unable to tolerate at this time  Continue PO cytotec  Will reassess and attempt cervical ballon at a later time  MD Joey Flynn NP

## 2022-03-18 LAB
ALBUMIN SERPL ELPH-MCNC: 2.2 G/DL — LOW (ref 3.3–5)
ALBUMIN SERPL ELPH-MCNC: 2.5 G/DL — LOW (ref 3.3–5)
ALP SERPL-CCNC: 155 U/L — HIGH (ref 40–120)
ALP SERPL-CCNC: 180 U/L — HIGH (ref 40–120)
ALT FLD-CCNC: 18 U/L — SIGNIFICANT CHANGE UP (ref 4–33)
ALT FLD-CCNC: 22 U/L — SIGNIFICANT CHANGE UP (ref 4–33)
ANION GAP SERPL CALC-SCNC: 10 MMOL/L — SIGNIFICANT CHANGE UP (ref 7–14)
ANION GAP SERPL CALC-SCNC: 10 MMOL/L — SIGNIFICANT CHANGE UP (ref 7–14)
APTT BLD: 25.9 SEC — LOW (ref 27–36.3)
APTT BLD: 28.5 SEC — SIGNIFICANT CHANGE UP (ref 27–36.3)
AST SERPL-CCNC: 27 U/L — SIGNIFICANT CHANGE UP (ref 4–32)
AST SERPL-CCNC: 31 U/L — SIGNIFICANT CHANGE UP (ref 4–32)
BASOPHILS # BLD AUTO: 0.03 K/UL — SIGNIFICANT CHANGE UP (ref 0–0.2)
BASOPHILS # BLD AUTO: 0.03 K/UL — SIGNIFICANT CHANGE UP (ref 0–0.2)
BASOPHILS NFR BLD AUTO: 0.1 % — SIGNIFICANT CHANGE UP (ref 0–2)
BASOPHILS NFR BLD AUTO: 0.2 % — SIGNIFICANT CHANGE UP (ref 0–2)
BILIRUB SERPL-MCNC: 0.4 MG/DL — SIGNIFICANT CHANGE UP (ref 0.2–1.2)
BILIRUB SERPL-MCNC: 0.5 MG/DL — SIGNIFICANT CHANGE UP (ref 0.2–1.2)
BUN SERPL-MCNC: 14 MG/DL — SIGNIFICANT CHANGE UP (ref 7–23)
BUN SERPL-MCNC: 14 MG/DL — SIGNIFICANT CHANGE UP (ref 7–23)
CALCIUM SERPL-MCNC: 7.6 MG/DL — LOW (ref 8.4–10.5)
CALCIUM SERPL-MCNC: 8.2 MG/DL — LOW (ref 8.4–10.5)
CHLORIDE SERPL-SCNC: 104 MMOL/L — SIGNIFICANT CHANGE UP (ref 98–107)
CHLORIDE SERPL-SCNC: 104 MMOL/L — SIGNIFICANT CHANGE UP (ref 98–107)
CO2 SERPL-SCNC: 19 MMOL/L — LOW (ref 22–31)
CO2 SERPL-SCNC: 19 MMOL/L — LOW (ref 22–31)
CREAT SERPL-MCNC: 1.03 MG/DL — SIGNIFICANT CHANGE UP (ref 0.5–1.3)
CREAT SERPL-MCNC: 1.05 MG/DL — SIGNIFICANT CHANGE UP (ref 0.5–1.3)
EGFR: 73 ML/MIN/1.73M2 — SIGNIFICANT CHANGE UP
EGFR: 75 ML/MIN/1.73M2 — SIGNIFICANT CHANGE UP
EOSINOPHIL # BLD AUTO: 0.02 K/UL — SIGNIFICANT CHANGE UP (ref 0–0.5)
EOSINOPHIL # BLD AUTO: 0.05 K/UL — SIGNIFICANT CHANGE UP (ref 0–0.5)
EOSINOPHIL NFR BLD AUTO: 0.1 % — SIGNIFICANT CHANGE UP (ref 0–6)
EOSINOPHIL NFR BLD AUTO: 0.3 % — SIGNIFICANT CHANGE UP (ref 0–6)
FIBRINOGEN PPP-MCNC: 633 MG/DL — HIGH (ref 330–520)
GLUCOSE SERPL-MCNC: 121 MG/DL — HIGH (ref 70–99)
GLUCOSE SERPL-MCNC: 77 MG/DL — SIGNIFICANT CHANGE UP (ref 70–99)
HCT VFR BLD CALC: 26.3 % — LOW (ref 34.5–45)
HCT VFR BLD CALC: 28.4 % — LOW (ref 34.5–45)
HGB BLD-MCNC: 8.9 G/DL — LOW (ref 11.5–15.5)
HGB BLD-MCNC: 9.4 G/DL — LOW (ref 11.5–15.5)
IANC: 13.4 K/UL — HIGH (ref 1.5–8.5)
IANC: 22.12 K/UL — HIGH (ref 1.5–8.5)
IMM GRANULOCYTES NFR BLD AUTO: 1.2 % — SIGNIFICANT CHANGE UP (ref 0–1.5)
IMM GRANULOCYTES NFR BLD AUTO: 1.5 % — SIGNIFICANT CHANGE UP (ref 0–1.5)
INR BLD: 1.03 RATIO — SIGNIFICANT CHANGE UP (ref 0.88–1.16)
INR BLD: 1.04 RATIO — SIGNIFICANT CHANGE UP (ref 0.88–1.16)
LDH SERPL L TO P-CCNC: 318 U/L — HIGH (ref 135–225)
LYMPHOCYTES # BLD AUTO: 0.79 K/UL — LOW (ref 1–3.3)
LYMPHOCYTES # BLD AUTO: 1.32 K/UL — SIGNIFICANT CHANGE UP (ref 1–3.3)
LYMPHOCYTES # BLD AUTO: 5.1 % — LOW (ref 13–44)
LYMPHOCYTES # BLD AUTO: 5.3 % — LOW (ref 13–44)
MCHC RBC-ENTMCNC: 29 PG — SIGNIFICANT CHANGE UP (ref 27–34)
MCHC RBC-ENTMCNC: 29 PG — SIGNIFICANT CHANGE UP (ref 27–34)
MCHC RBC-ENTMCNC: 33.1 GM/DL — SIGNIFICANT CHANGE UP (ref 32–36)
MCHC RBC-ENTMCNC: 33.8 GM/DL — SIGNIFICANT CHANGE UP (ref 32–36)
MCV RBC AUTO: 85.7 FL — SIGNIFICANT CHANGE UP (ref 80–100)
MCV RBC AUTO: 87.7 FL — SIGNIFICANT CHANGE UP (ref 80–100)
MONOCYTES # BLD AUTO: 1.04 K/UL — HIGH (ref 0–0.9)
MONOCYTES # BLD AUTO: 1.33 K/UL — HIGH (ref 0–0.9)
MONOCYTES NFR BLD AUTO: 5.3 % — SIGNIFICANT CHANGE UP (ref 2–14)
MONOCYTES NFR BLD AUTO: 6.7 % — SIGNIFICANT CHANGE UP (ref 2–14)
NEUTROPHILS # BLD AUTO: 13.4 K/UL — HIGH (ref 1.8–7.4)
NEUTROPHILS # BLD AUTO: 22.12 K/UL — HIGH (ref 1.8–7.4)
NEUTROPHILS NFR BLD AUTO: 86.2 % — HIGH (ref 43–77)
NEUTROPHILS NFR BLD AUTO: 88 % — HIGH (ref 43–77)
NRBC # BLD: 0 /100 WBCS — SIGNIFICANT CHANGE UP
NRBC # BLD: 0 /100 WBCS — SIGNIFICANT CHANGE UP
NRBC # FLD: 0.02 K/UL — HIGH
NRBC # FLD: 0.03 K/UL — HIGH
PLATELET # BLD AUTO: 113 K/UL — LOW (ref 150–400)
PLATELET # BLD AUTO: 116 K/UL — LOW (ref 150–400)
POTASSIUM SERPL-MCNC: 4.7 MMOL/L — SIGNIFICANT CHANGE UP (ref 3.5–5.3)
POTASSIUM SERPL-MCNC: 4.7 MMOL/L — SIGNIFICANT CHANGE UP (ref 3.5–5.3)
POTASSIUM SERPL-SCNC: 4.7 MMOL/L — SIGNIFICANT CHANGE UP (ref 3.5–5.3)
POTASSIUM SERPL-SCNC: 4.7 MMOL/L — SIGNIFICANT CHANGE UP (ref 3.5–5.3)
PROT SERPL-MCNC: 4.8 G/DL — LOW (ref 6–8.3)
PROT SERPL-MCNC: 5.1 G/DL — LOW (ref 6–8.3)
PROTHROM AB SERPL-ACNC: 11.9 SEC — SIGNIFICANT CHANGE UP (ref 10.5–13.4)
PROTHROM AB SERPL-ACNC: 12.1 SEC — SIGNIFICANT CHANGE UP (ref 10.5–13.4)
RBC # BLD: 3.07 M/UL — LOW (ref 3.8–5.2)
RBC # BLD: 3.24 M/UL — LOW (ref 3.8–5.2)
RBC # FLD: 12.2 % — SIGNIFICANT CHANGE UP (ref 10.3–14.5)
RBC # FLD: 12.2 % — SIGNIFICANT CHANGE UP (ref 10.3–14.5)
SODIUM SERPL-SCNC: 133 MMOL/L — LOW (ref 135–145)
SODIUM SERPL-SCNC: 133 MMOL/L — LOW (ref 135–145)
URATE SERPL-MCNC: 7.3 MG/DL — HIGH (ref 2.5–7)
URATE SERPL-MCNC: 8.3 MG/DL — HIGH (ref 2.5–7)
WBC # BLD: 15.55 K/UL — HIGH (ref 3.8–10.5)
WBC # BLD: 25.12 K/UL — HIGH (ref 3.8–10.5)
WBC # FLD AUTO: 15.55 K/UL — HIGH (ref 3.8–10.5)
WBC # FLD AUTO: 25.12 K/UL — HIGH (ref 3.8–10.5)

## 2022-03-18 PROCEDURE — 59510 CESAREAN DELIVERY: CPT

## 2022-03-18 RX ORDER — GENTAMICIN SULFATE 40 MG/ML
380 VIAL (ML) INJECTION ONCE
Refills: 0 | Status: DISCONTINUED | OUTPATIENT
Start: 2022-03-18 | End: 2022-03-18

## 2022-03-18 RX ORDER — CITRIC ACID/SODIUM CITRATE 300-500 MG
30 SOLUTION, ORAL ORAL ONCE
Refills: 0 | Status: DISCONTINUED | OUTPATIENT
Start: 2022-03-18 | End: 2022-03-18

## 2022-03-18 RX ORDER — MAGNESIUM HYDROXIDE 400 MG/1
30 TABLET, CHEWABLE ORAL
Refills: 0 | Status: DISCONTINUED | OUTPATIENT
Start: 2022-03-18 | End: 2022-03-22

## 2022-03-18 RX ORDER — LANOLIN
1 OINTMENT (GRAM) TOPICAL EVERY 6 HOURS
Refills: 0 | Status: DISCONTINUED | OUTPATIENT
Start: 2022-03-18 | End: 2022-03-22

## 2022-03-18 RX ORDER — SODIUM CHLORIDE 9 MG/ML
1000 INJECTION, SOLUTION INTRAVENOUS
Refills: 0 | Status: DISCONTINUED | OUTPATIENT
Start: 2022-03-18 | End: 2022-03-18

## 2022-03-18 RX ORDER — SODIUM CHLORIDE 9 MG/ML
1000 INJECTION, SOLUTION INTRAVENOUS ONCE
Refills: 0 | Status: DISCONTINUED | OUTPATIENT
Start: 2022-03-18 | End: 2022-03-18

## 2022-03-18 RX ORDER — KETOROLAC TROMETHAMINE 30 MG/ML
30 SYRINGE (ML) INJECTION EVERY 6 HOURS
Refills: 0 | Status: COMPLETED | OUTPATIENT
Start: 2022-03-18 | End: 2022-03-19

## 2022-03-18 RX ORDER — TETANUS TOXOID, REDUCED DIPHTHERIA TOXOID AND ACELLULAR PERTUSSIS VACCINE, ADSORBED 5; 2.5; 8; 8; 2.5 [IU]/.5ML; [IU]/.5ML; UG/.5ML; UG/.5ML; UG/.5ML
0.5 SUSPENSION INTRAMUSCULAR ONCE
Refills: 0 | Status: DISCONTINUED | OUTPATIENT
Start: 2022-03-18 | End: 2022-03-22

## 2022-03-18 RX ORDER — GENTAMICIN SULFATE 40 MG/ML
310 VIAL (ML) INJECTION EVERY 24 HOURS
Refills: 0 | Status: COMPLETED | OUTPATIENT
Start: 2022-03-18 | End: 2022-03-19

## 2022-03-18 RX ORDER — OXYCODONE HYDROCHLORIDE 5 MG/1
5 TABLET ORAL
Refills: 0 | Status: COMPLETED | OUTPATIENT
Start: 2022-03-18 | End: 2022-03-25

## 2022-03-18 RX ORDER — ACETAMINOPHEN 500 MG
975 TABLET ORAL ONCE
Refills: 0 | Status: DISCONTINUED | OUTPATIENT
Start: 2022-03-18 | End: 2022-03-18

## 2022-03-18 RX ORDER — OXYTOCIN 10 UNIT/ML
VIAL (ML) INJECTION
Qty: 30 | Refills: 0 | Status: DISCONTINUED | OUTPATIENT
Start: 2022-03-18 | End: 2022-03-18

## 2022-03-18 RX ORDER — SODIUM CHLORIDE 9 MG/ML
1000 INJECTION, SOLUTION INTRAVENOUS
Refills: 0 | Status: DISCONTINUED | OUTPATIENT
Start: 2022-03-18 | End: 2022-03-22

## 2022-03-18 RX ORDER — DIPHENHYDRAMINE HCL 50 MG
25 CAPSULE ORAL EVERY 6 HOURS
Refills: 0 | Status: DISCONTINUED | OUTPATIENT
Start: 2022-03-18 | End: 2022-03-22

## 2022-03-18 RX ORDER — OXYCODONE HYDROCHLORIDE 5 MG/1
5 TABLET ORAL ONCE
Refills: 0 | Status: DISCONTINUED | OUTPATIENT
Start: 2022-03-18 | End: 2022-03-22

## 2022-03-18 RX ORDER — SIMETHICONE 80 MG/1
80 TABLET, CHEWABLE ORAL EVERY 4 HOURS
Refills: 0 | Status: DISCONTINUED | OUTPATIENT
Start: 2022-03-18 | End: 2022-03-22

## 2022-03-18 RX ORDER — OXYTOCIN 10 UNIT/ML
333.33 VIAL (ML) INJECTION
Qty: 20 | Refills: 0 | Status: COMPLETED | OUTPATIENT
Start: 2022-03-18 | End: 2022-03-18

## 2022-03-18 RX ORDER — AMPICILLIN TRIHYDRATE 250 MG
2 CAPSULE ORAL ONCE
Refills: 0 | Status: DISCONTINUED | OUTPATIENT
Start: 2022-03-18 | End: 2022-03-18

## 2022-03-18 RX ORDER — FAMOTIDINE 10 MG/ML
20 INJECTION INTRAVENOUS ONCE
Refills: 0 | Status: DISCONTINUED | OUTPATIENT
Start: 2022-03-18 | End: 2022-03-18

## 2022-03-18 RX ORDER — OXYTOCIN 10 UNIT/ML
333.33 VIAL (ML) INJECTION
Qty: 20 | Refills: 0 | Status: DISCONTINUED | OUTPATIENT
Start: 2022-03-18 | End: 2022-03-22

## 2022-03-18 RX ORDER — ACETAMINOPHEN 500 MG
975 TABLET ORAL
Refills: 0 | Status: DISCONTINUED | OUTPATIENT
Start: 2022-03-18 | End: 2022-03-22

## 2022-03-18 RX ORDER — IBUPROFEN 200 MG
600 TABLET ORAL EVERY 6 HOURS
Refills: 0 | Status: COMPLETED | OUTPATIENT
Start: 2022-03-18 | End: 2023-02-14

## 2022-03-18 RX ORDER — HEPARIN SODIUM 5000 [USP'U]/ML
5000 INJECTION INTRAVENOUS; SUBCUTANEOUS EVERY 12 HOURS
Refills: 0 | Status: DISCONTINUED | OUTPATIENT
Start: 2022-03-18 | End: 2022-03-22

## 2022-03-18 RX ADMIN — Medication 2 MILLIUNIT(S)/MIN: at 08:32

## 2022-03-18 RX ADMIN — Medication 975 MILLIGRAM(S): at 20:38

## 2022-03-18 RX ADMIN — SODIUM CHLORIDE 125 MILLILITER(S): 9 INJECTION, SOLUTION INTRAVENOUS at 08:32

## 2022-03-18 RX ADMIN — Medication 30 MILLILITER(S): at 17:57

## 2022-03-18 RX ADMIN — Medication 100 MILLIGRAM(S): at 17:51

## 2022-03-18 RX ADMIN — Medication 1000 MILLIUNIT(S)/MIN: at 17:54

## 2022-03-18 RX ADMIN — FAMOTIDINE 20 MILLIGRAM(S): 10 INJECTION INTRAVENOUS at 15:10

## 2022-03-18 RX ADMIN — Medication 500 MILLIGRAM(S): at 18:57

## 2022-03-18 NOTE — OB PROVIDER LABOR PROGRESS NOTE - ASSESSMENT
No cervical change noted  AROM from 0240-clear fluid noted currently  IUPC placed  Pitocin to continue; currently @16mu/hr  Fetal tachycardia noted; IV bolus initiated  Patient afebrile 36.6-oral   Approved for epidural redose  Will reassess PRN    d/w MD Samia Flynn NP

## 2022-03-18 NOTE — OB PROVIDER LABOR PROGRESS NOTE - NS_OBIHIFHRDETAILS_OBGYN_ALL_OB_FT
155 moderate variability, accelerations present, no decelerations
145 moderate/+accels/-decels
155 moderate/+accels/+decels
140 moderate variability/ +accels/-decels
160 moderate variability/+accels/+decels
cat 1 tracing

## 2022-03-18 NOTE — OB PROVIDER LABOR PROGRESS NOTE - ASSESSMENT
IUPC replaced, exam unchanged.    -  by the bedside  - Lactated Ringer bolus in progress   - IVH 150cc changed to 250cc/hr

## 2022-03-18 NOTE — OB PROVIDER DELIVERY SUMMARY - NSSELHIDDEN_OBGYN_ALL_OB_FT
[NS_DeliveryAttending1_OBGYN_ALL_OB_FT:[id number]=],[NS_DeliveryAssist1_OBGYN_ALL_OB_FT:[id number]=],[NS_DeliveryRN_OBGYN_ALL_OB_FT:[id number]=]

## 2022-03-18 NOTE — OB RN DELIVERY SUMMARY - NSSELHIDDEN_OBGYN_ALL_OB_FT
[NS_DeliveryAttending1_OBGYN_ALL_OB_FT:[id number]=],[NS_DeliveryAssist1_OBGYN_ALL_OB_FT:[id number]=],[NS_DeliveryRN_OBGYN_ALL_OB_FT:[id number]=]

## 2022-03-18 NOTE — OB PROVIDER LABOR PROGRESS NOTE - NS_SUBJECTIVE/OBJECTIVE_OBGYN_ALL_OB_FT
IUPC to be readjusted/replaced    Vital Signs Last 24 Hrs  T(C): 36.8 (18 Mar 2022 08:30), Max: 37.0 (17 Mar 2022 19:58)  T(F): 98.24 (18 Mar 2022 08:30), Max: 98.6 (17 Mar 2022 19:58)  HR: 85 (18 Mar 2022 10:10) (71 - 109)  BP: 115/66 (18 Mar 2022 10:10) (107/63 - 153/90)  SpO2: 98% (18 Mar 2022 10:10) (87% - 100%)
Patient seen and examined for category II tracing. Patient comfortable with epidural.  BP: 140/84  HR:84  Temp: 38.5C
CB placed
Patient seen and evaluated for placement of cervical balloon. No complaints at this time.
Patient seen and examined for vaginal exam
Patient seen and examined for placement of IUPC. Patient c/o contraction pain/rectal pressure.  VS  T(C): 36.8 (03-18-22 @ 08:30)  HR: 85 (03-18-22 @ 09:28)  BP: 127/73 (03-18-22 @ 09:28)  SpO2: 93% (03-18-22 @ 09:28)

## 2022-03-18 NOTE — OB RN DELIVERY SUMMARY - AMNIOTIC FLUID AMOUNT, LABOR
Continue torsemide 10mg daily and one potassium  Same dose of Lisinopril    Labs on 10-8 when you see Dr. Conde    See me back in  
within normal limits

## 2022-03-18 NOTE — OB NEONATOLOGY/PEDIATRICIAN DELIVERY SUMMARY - NSMATERNALFETALCONCERNS_OBGYN_ALL_OB_FT
needs device
MATERNAL FETAL ALERT  Posterior placenta is PREVIA 11/16/2021  minimal pericardial effusion on fetal echo   Kellie Nicholson RN 11/26/2021

## 2022-03-18 NOTE — CHART NOTE - NSCHARTNOTEFT_GEN_A_CORE
Pt comfortable  NST categ 1  ve 4/80/-3  induction for gHTN  balloon removed  consider AROM when head descends

## 2022-03-18 NOTE — PACU DISCHARGE NOTE - COMMENTS
T(C): 36.5 (03-18-22 @ 20:30), Max: 38.5 (03-18-22 @ 14:46)  HR: 68 (03-18-22 @ 22:30) (67 - 109)  BP: 137/85 (03-18-22 @ 22:30) (115/66 - 153/90)  RR: 16 (03-18-22 @ 22:30) (16 - 22)  SpO2: 98% (03-18-22 @ 22:30) (79% - 100%)    Vital signs stable. Pain and nausea are controlled. Appropriate strength and sensation in bilateral lower extremities. Signs of neuraxial complications to be aware of were discussed with the patient, to which she expressed understanding. All questions answered. Stable for transfer to the floor.

## 2022-03-18 NOTE — OB PROVIDER DELIVERY SUMMARY - NSPROVIDERDELIVERYNOTE_OBGYN_ALL_OB_FT
viable female infant, OP presentation, weight 2909g, APGARS 9/9  grossly normal uters, b/l tubes and ovaries  hysterotomy closed in 1 layer  peritoneum and rectus muscle closed in an interrupted fashion  Patient spiked an intrapartum temp for which patient received ampicillin and gentamicin, in the operating room the patient received azithromycin and ancef, which would have covered ampicillin, patient to receive a dose of clindamycin postpartum as well as an extra dose of ampicillin, and an extra dose of gentamicin    402/1500/325    Dictation #: viable female infant, OP presentation, weight 2909g, APGARS 9/9  grossly normal uters, b/l tubes and ovaries  hysterotomy closed in 1 layer  peritoneum and rectus muscle closed in an interrupted fashion  Patient spiked an intrapartum temp for which patient received ampicillin and gentamicin, in the operating room the patient received azithromycin and ancef, which would have covered ampicillin, patient to receive a dose of clindamycin postpartum as well as an extra dose of ampicillin, and an extra dose of gentamicin    402/1500/325      Dictation #: [id number]

## 2022-03-18 NOTE — CHART NOTE - NSCHARTNOTEFT_GEN_A_CORE
2025    House officer at the bedside given pt spontaneous desaturation in the PACU. Anesthesiologist also at the bedside discussing recommendation for ICS. Patient denies any chest pain, shortness of breath, or any other complaints    Gen: No acute distress. Awake. Alert  CV: Regular rate and rhythm. No murmurs appreciated  Pulm: Clear to auscultation bilaterally. No respiratory distress. No wheezes, rales, or rhonchi  - SaO2 noted to spontaneously desaturate to 88% with improvement to the high 90s with use of incentive spirometer   Abd: Soft. Non-tender. Pfannensteil incision site w/ overlying bandage   : White in place   Extremities: No calf tenderness bilaterally. SCDs in place     A/P: Pt POD#0 w/ post-op hypoxia c/f post-op atelectasis  - Will continue to monitor for improvement in oxygenation with persistent use of ICS  - Continuous pulse ox for the floor  - Consider supplemental O2 if saturation does not improve with persistent use of ICS    Ji Jaime  PGY-1, Obstetrics & Gynecology 2025    House officer at the bedside given pt spontaneous desaturation in the PACU. Anesthesiologist also at the bedside discussing recommendation for ICS. Patient denies any chest pain, shortness of breath, or any other complaints. Denies any hx of asthma, KAPIL, or respiratory issues     Gen: No acute distress. Awake. Alert  CV: Regular rate and rhythm. No murmurs appreciated  Pulm: Clear to auscultation bilaterally. No respiratory distress. No wheezes, rales, or rhonchi  - SaO2 noted to spontaneously desaturate to 88% with improvement to the high 90s with use of incentive spirometer   Abd: Soft. Non-tender. Pfannensteil incision site w/ overlying bandage   : White in place   Extremities: No calf tenderness bilaterally. SCDs in place     A/P: Pt POD#0 w/ post-op hypoxia c/f post-op atelectasis  - Will continue to monitor for improvement in oxygenation with persistent use of ICS  - Continuous pulse ox for the floor  - Consider supplemental O2 if saturation does not improve with persistent use of ICS    Ji Jaime  PGY-1, Obstetrics & Gynecology    d/w Dr. Crespo

## 2022-03-18 NOTE — OB RN DELIVERY SUMMARY - NS_SEPSISRSKCALC_OBGYN_ALL_OB_FT
EOS calculated successfully. EOS Risk Factor: 0.5/1000 live births ([facility] national incidence); GA=37w3d; Temp=101.3; ROM=13.183; GBS='Negative'; Antibiotics='Broad spectrum antibiotics 2-3.9 hrs prior to birth'

## 2022-03-18 NOTE — OB NEONATOLOGY/PEDIATRICIAN DELIVERY SUMMARY - NSPEDSNEONOTESA_OBGYN_ALL_OB_FT
Called by to attend primary c- section delivery due to category II tracing along with maternal fever. Baby is  product of a 37.3 week gestation born to a     31 year old female   Maternal labs include Blood Type A+ , HIV -, RPR - , Rubella Immune, Hep B[ - ], GBS - on 3/14 , rest is unremarkable. Maternal history is significant for gHTN. Pregnancy was complicated by posterior placenta previa resolved on 3/1/22 and mild pericardial effusion since resolved.   AROM at 02:41 with bloody fluids , approximately 13 hrs.  Resuscitation included: w/d/s/s .  Apgars were: 8/9 . EOS score 1.68. Admit to NICU for sepsis workup. Called by to attend primary c- section delivery due to category II tracing along with maternal fever. Baby is  product of a 37.3 week gestation born to a     31 year old female   Maternal labs include Blood Type A+ , HIV -, RPR - , Rubella Immune, Hep B[ - ], GBS - on 3/14 , rest is unremarkable. Maternal history is significant for gHTN. Pregnancy was complicated by posterior placenta previa resolved on 3/1/22 and mild pericardial effusion since resolved.   AROM at 02:41 with bloody fluids , approximately 13 hrs.  Resuscitation included: w/d/s/s .  Apgars were: 8/9 . EOS score 2.74. Admit to NICU for sepsis workup.

## 2022-03-18 NOTE — OB POSTPARTUM EVENT NOTE - NS_EVENTPTSUMMARY1_OBGYN_ALL_OB_FT
Pt oxygen saturation at 19:30 is 96. Upon taking oxygen off oxygen saturation at 20:00 is 90. BP for 20:00 is 151/88. Pulse is 81. RR is 21. Lungs are equal and clear bilaterally. Pt report no difficulty breathing, denies chest pain or shortness of breath. MD Jaime and anesthesia  paged to bedside for desaturation.

## 2022-03-18 NOTE — OB PROVIDER LABOR PROGRESS NOTE - ASSESSMENT
No cervical change noted, patient febrile at this time, rectal temperature-38.5 C  Patient s/p PO cyotec and cervical balloon  AROM-240am  Pitocin started at 1045am  Currently on high dose pitocin; at 26mu/hr with IUPC in place  Inadequate uterine contractions as per MVU  Pitocin d/c'd  Patient repositioned to left lateral with improvement of FHR tracing  Tylenol and antibiotics ordered for suspected chorio  Potential operative delivery discussed with patient due to failed induction/FHR tracing/chorio, MD Gracia to speak with patient and consent for operative delivery  Charge nurse aware of plan for operative delivery as per MD Samia Flynn NP

## 2022-03-18 NOTE — OB POSTPARTUM EVENT NOTE - NS_EVENTFINDINGS1_OBGYN_ALL_OB_FT
MD Arshad and MD Jaime at bedside evaluating pt. Pt to use incentive spirometry. Pt educated with show back. Pt verbalized understanding.

## 2022-03-18 NOTE — OB RN INTRAOPERATIVE NOTE - NSSELHIDDEN_OBGYN_ALL_OB_FT
[NS_DeliveryAttending1_OBGYN_ALL_OB_FT:[id number]],[NS_DeliveryAssist1_OBGYN_ALL_OB_FT:[id number]=],[NS_DeliveryRN_OBGYN_ALL_OB_FT:[id number]=]

## 2022-03-19 LAB
ALBUMIN SERPL ELPH-MCNC: 2 G/DL — LOW (ref 3.3–5)
ALBUMIN SERPL ELPH-MCNC: 2.2 G/DL — LOW (ref 3.3–5)
ALP SERPL-CCNC: 121 U/L — HIGH (ref 40–120)
ALP SERPL-CCNC: 139 U/L — HIGH (ref 40–120)
ALT FLD-CCNC: 16 U/L — SIGNIFICANT CHANGE UP (ref 4–33)
ALT FLD-CCNC: 17 U/L — SIGNIFICANT CHANGE UP (ref 4–33)
ANION GAP SERPL CALC-SCNC: 10 MMOL/L — SIGNIFICANT CHANGE UP (ref 7–14)
ANION GAP SERPL CALC-SCNC: 10 MMOL/L — SIGNIFICANT CHANGE UP (ref 7–14)
APTT BLD: 26.8 SEC — LOW (ref 27–36.3)
AST SERPL-CCNC: 28 U/L — SIGNIFICANT CHANGE UP (ref 4–32)
AST SERPL-CCNC: 29 U/L — SIGNIFICANT CHANGE UP (ref 4–32)
BASOPHILS # BLD AUTO: 0.04 K/UL — SIGNIFICANT CHANGE UP (ref 0–0.2)
BASOPHILS NFR BLD AUTO: 0.2 % — SIGNIFICANT CHANGE UP (ref 0–2)
BILIRUB SERPL-MCNC: 0.3 MG/DL — SIGNIFICANT CHANGE UP (ref 0.2–1.2)
BILIRUB SERPL-MCNC: <0.2 MG/DL — SIGNIFICANT CHANGE UP (ref 0.2–1.2)
BUN SERPL-MCNC: 14 MG/DL — SIGNIFICANT CHANGE UP (ref 7–23)
BUN SERPL-MCNC: 16 MG/DL — SIGNIFICANT CHANGE UP (ref 7–23)
CALCIUM SERPL-MCNC: 7.4 MG/DL — LOW (ref 8.4–10.5)
CALCIUM SERPL-MCNC: 7.6 MG/DL — LOW (ref 8.4–10.5)
CHLORIDE SERPL-SCNC: 100 MMOL/L — SIGNIFICANT CHANGE UP (ref 98–107)
CHLORIDE SERPL-SCNC: 97 MMOL/L — LOW (ref 98–107)
CO2 SERPL-SCNC: 19 MMOL/L — LOW (ref 22–31)
CO2 SERPL-SCNC: 21 MMOL/L — LOW (ref 22–31)
CREAT SERPL-MCNC: 1.1 MG/DL — SIGNIFICANT CHANGE UP (ref 0.5–1.3)
CREAT SERPL-MCNC: 1.14 MG/DL — SIGNIFICANT CHANGE UP (ref 0.5–1.3)
EGFR: 66 ML/MIN/1.73M2 — SIGNIFICANT CHANGE UP
EGFR: 69 ML/MIN/1.73M2 — SIGNIFICANT CHANGE UP
EOSINOPHIL # BLD AUTO: 0.01 K/UL — SIGNIFICANT CHANGE UP (ref 0–0.5)
EOSINOPHIL NFR BLD AUTO: 0 % — SIGNIFICANT CHANGE UP (ref 0–6)
FIBRINOGEN PPP-MCNC: 622 MG/DL — HIGH (ref 330–520)
GLUCOSE SERPL-MCNC: 89 MG/DL — SIGNIFICANT CHANGE UP (ref 70–99)
GLUCOSE SERPL-MCNC: 94 MG/DL — SIGNIFICANT CHANGE UP (ref 70–99)
HCT VFR BLD CALC: 23.8 % — LOW (ref 34.5–45)
HCT VFR BLD CALC: 25.2 % — LOW (ref 34.5–45)
HGB BLD-MCNC: 7.8 G/DL — LOW (ref 11.5–15.5)
HGB BLD-MCNC: 8.5 G/DL — LOW (ref 11.5–15.5)
IANC: 20.57 K/UL — HIGH (ref 1.5–8.5)
IMM GRANULOCYTES NFR BLD AUTO: 1 % — SIGNIFICANT CHANGE UP (ref 0–1.5)
INR BLD: 1.06 RATIO — SIGNIFICANT CHANGE UP (ref 0.88–1.16)
LDH SERPL L TO P-CCNC: 287 U/L — HIGH (ref 135–225)
LYMPHOCYTES # BLD AUTO: 1.24 K/UL — SIGNIFICANT CHANGE UP (ref 1–3.3)
LYMPHOCYTES # BLD AUTO: 5.2 % — LOW (ref 13–44)
MCHC RBC-ENTMCNC: 28 PG — SIGNIFICANT CHANGE UP (ref 27–34)
MCHC RBC-ENTMCNC: 28.4 PG — SIGNIFICANT CHANGE UP (ref 27–34)
MCHC RBC-ENTMCNC: 32.8 GM/DL — SIGNIFICANT CHANGE UP (ref 32–36)
MCHC RBC-ENTMCNC: 33.7 GM/DL — SIGNIFICANT CHANGE UP (ref 32–36)
MCV RBC AUTO: 84.3 FL — SIGNIFICANT CHANGE UP (ref 80–100)
MCV RBC AUTO: 85.3 FL — SIGNIFICANT CHANGE UP (ref 80–100)
MONOCYTES # BLD AUTO: 1.8 K/UL — HIGH (ref 0–0.9)
MONOCYTES NFR BLD AUTO: 7.5 % — SIGNIFICANT CHANGE UP (ref 2–14)
NEUTROPHILS # BLD AUTO: 20.57 K/UL — HIGH (ref 1.8–7.4)
NEUTROPHILS NFR BLD AUTO: 86.1 % — HIGH (ref 43–77)
NRBC # BLD: 0 /100 WBCS — SIGNIFICANT CHANGE UP
NRBC # BLD: 0 /100 WBCS — SIGNIFICANT CHANGE UP
NRBC # FLD: 0.02 K/UL — HIGH
NRBC # FLD: 0.02 K/UL — HIGH
PLATELET # BLD AUTO: 116 K/UL — LOW (ref 150–400)
PLATELET # BLD AUTO: 126 K/UL — LOW (ref 150–400)
POTASSIUM SERPL-MCNC: 4.6 MMOL/L — SIGNIFICANT CHANGE UP (ref 3.5–5.3)
POTASSIUM SERPL-MCNC: 5 MMOL/L — SIGNIFICANT CHANGE UP (ref 3.5–5.3)
POTASSIUM SERPL-SCNC: 4.6 MMOL/L — SIGNIFICANT CHANGE UP (ref 3.5–5.3)
POTASSIUM SERPL-SCNC: 5 MMOL/L — SIGNIFICANT CHANGE UP (ref 3.5–5.3)
PROT SERPL-MCNC: 4.3 G/DL — LOW (ref 6–8.3)
PROT SERPL-MCNC: 4.5 G/DL — LOW (ref 6–8.3)
PROTHROM AB SERPL-ACNC: 12.3 SEC — SIGNIFICANT CHANGE UP (ref 10.5–13.4)
RBC # BLD: 2.79 M/UL — LOW (ref 3.8–5.2)
RBC # BLD: 2.99 M/UL — LOW (ref 3.8–5.2)
RBC # FLD: 12.3 % — SIGNIFICANT CHANGE UP (ref 10.3–14.5)
RBC # FLD: 12.4 % — SIGNIFICANT CHANGE UP (ref 10.3–14.5)
SODIUM SERPL-SCNC: 126 MMOL/L — LOW (ref 135–145)
SODIUM SERPL-SCNC: 131 MMOL/L — LOW (ref 135–145)
URATE SERPL-MCNC: 7.5 MG/DL — HIGH (ref 2.5–7)
WBC # BLD: 17.07 K/UL — HIGH (ref 3.8–10.5)
WBC # BLD: 23.89 K/UL — HIGH (ref 3.8–10.5)
WBC # FLD AUTO: 17.07 K/UL — HIGH (ref 3.8–10.5)
WBC # FLD AUTO: 23.89 K/UL — HIGH (ref 3.8–10.5)

## 2022-03-19 RX ORDER — SODIUM CHLORIDE 9 MG/ML
1000 INJECTION, SOLUTION INTRAVENOUS
Refills: 0 | Status: DISCONTINUED | OUTPATIENT
Start: 2022-03-19 | End: 2022-03-22

## 2022-03-19 RX ORDER — IBUPROFEN 200 MG
600 TABLET ORAL EVERY 6 HOURS
Refills: 0 | Status: DISCONTINUED | OUTPATIENT
Start: 2022-03-19 | End: 2022-03-22

## 2022-03-19 RX ORDER — SENNA PLUS 8.6 MG/1
1 TABLET ORAL
Refills: 0 | Status: DISCONTINUED | OUTPATIENT
Start: 2022-03-19 | End: 2022-03-22

## 2022-03-19 RX ORDER — ACETAMINOPHEN 500 MG
975 TABLET ORAL ONCE
Refills: 0 | Status: COMPLETED | OUTPATIENT
Start: 2022-03-19 | End: 2022-03-20

## 2022-03-19 RX ORDER — MAGNESIUM SULFATE 500 MG/ML
1 VIAL (ML) INJECTION
Qty: 40 | Refills: 0 | Status: DISCONTINUED | OUTPATIENT
Start: 2022-03-19 | End: 2022-03-20

## 2022-03-19 RX ORDER — ASCORBIC ACID 60 MG
500 TABLET,CHEWABLE ORAL DAILY
Refills: 0 | Status: DISCONTINUED | OUTPATIENT
Start: 2022-03-19 | End: 2022-03-22

## 2022-03-19 RX ORDER — MAGNESIUM SULFATE 500 MG/ML
4 VIAL (ML) INJECTION ONCE
Refills: 0 | Status: COMPLETED | OUTPATIENT
Start: 2022-03-19 | End: 2022-03-19

## 2022-03-19 RX ORDER — SODIUM CHLORIDE 9 MG/ML
500 INJECTION, SOLUTION INTRAVENOUS ONCE
Refills: 0 | Status: COMPLETED | OUTPATIENT
Start: 2022-03-19 | End: 2022-03-19

## 2022-03-19 RX ORDER — FERROUS SULFATE 325(65) MG
325 TABLET ORAL THREE TIMES A DAY
Refills: 0 | Status: DISCONTINUED | OUTPATIENT
Start: 2022-03-19 | End: 2022-03-22

## 2022-03-19 RX ORDER — DIPHENHYDRAMINE HCL 50 MG
25 CAPSULE ORAL ONCE
Refills: 0 | Status: COMPLETED | OUTPATIENT
Start: 2022-03-19 | End: 2022-03-20

## 2022-03-19 RX ADMIN — SODIUM CHLORIDE 1000 MILLILITER(S): 9 INJECTION, SOLUTION INTRAVENOUS at 19:05

## 2022-03-19 RX ADMIN — Medication 300 GRAM(S): at 21:53

## 2022-03-19 RX ADMIN — Medication 975 MILLIGRAM(S): at 10:15

## 2022-03-19 RX ADMIN — Medication 25 GM/HR: at 22:16

## 2022-03-19 RX ADMIN — Medication 600 MILLIGRAM(S): at 18:23

## 2022-03-19 RX ADMIN — Medication 600 MILLIGRAM(S): at 18:55

## 2022-03-19 RX ADMIN — Medication 325 MILLIGRAM(S): at 22:50

## 2022-03-19 RX ADMIN — HEPARIN SODIUM 5000 UNIT(S): 5000 INJECTION INTRAVENOUS; SUBCUTANEOUS at 06:10

## 2022-03-19 RX ADMIN — SIMETHICONE 80 MILLIGRAM(S): 80 TABLET, CHEWABLE ORAL at 18:24

## 2022-03-19 RX ADMIN — SIMETHICONE 80 MILLIGRAM(S): 80 TABLET, CHEWABLE ORAL at 09:45

## 2022-03-19 RX ADMIN — HEPARIN SODIUM 5000 UNIT(S): 5000 INJECTION INTRAVENOUS; SUBCUTANEOUS at 18:23

## 2022-03-19 RX ADMIN — Medication 30 MILLIGRAM(S): at 13:20

## 2022-03-19 RX ADMIN — Medication 500 MILLIGRAM(S): at 18:24

## 2022-03-19 RX ADMIN — Medication 30 MILLIGRAM(S): at 12:50

## 2022-03-19 RX ADMIN — SODIUM CHLORIDE 75 MILLILITER(S): 9 INJECTION, SOLUTION INTRAVENOUS at 21:52

## 2022-03-19 RX ADMIN — Medication 975 MILLIGRAM(S): at 09:45

## 2022-03-19 NOTE — PROVIDER CONTACT NOTE (OTHER) - RECOMMENDATIONS
Provider will be up to access patient. Provider will be up to access patient and advice of further actions.

## 2022-03-19 NOTE — PROVIDER CONTACT NOTE (OTHER) - ASSESSMENT
Vitals are  BP is118/77, HR is 76 and O2 is 99% Temp 97.6 Vitals are stable, BP is118/75, HR is 76 and O2 is 99% Temp 97.6 and resp 18.

## 2022-03-19 NOTE — CHART NOTE - NSCHARTNOTEFT_GEN_A_CORE
Pt's BPs and labs reviewed. Given elevated BPs >4 hours apart + Cr of >1.1, pt meets criteria for sPEC and requires Mag sulfate infusion.     Informed patient about diagnosis, explained implications and importance of Mag. Nursing staff informed. Will initiate Mag ASAP and leave on for 24 hours after initiation. Maintenance will be at half dose given elevated Cr.     Pt also Pt's BPs and labs reviewed. Given elevated BPs >4 hours apart + Cr of >1.1, pt meets criteria for sPEC and requires Mag sulfate infusion.     Informed patient about diagnosis of sPEC, explained diagnosis, implications and importance of Mag, and answered all questions. Pt denies severe features. Nursing staff informed re: mag infusion, will coordinate with LDR for loading dose.     Pt also reporting lightheadedness and dizziness to NP. STAT CBC sent with H/H 7.8/23.8. At bedside, pt also reports feelings of fatigue. Given symptomatic anemia, 1u pRBC transfusion offered to patient. All risks and benefits explained, questions answered.     Will initiate Mag ASAP and leave on for 24 hours after initiation. Maintenance will be at half dose given elevated Cr.     Will transfuse pt with 1u pRBC over 3 hours. To be initiated at same time as maintenance dose of Mag.      d/w attending MD Dr. Lorenz and PGY-4 Olivia Lopez, PGY-1

## 2022-03-19 NOTE — PROVIDER CONTACT NOTE (OTHER) - RECOMMENDATIONS
Dr Beltran notified to enter order for 2 liter O2 currently in place and to notify RN how long O2 is suppose to be in place.

## 2022-03-19 NOTE — LACTATION INITIAL EVALUATION - LACTATION INTERVENTIONS
initiate/review safe skin-to-skin/initiate/review hand expression/reverse pressure softening/initiate/review techniques for position and latch/initiate/review finger suck/initiate/review breast massage/compression/reviewed components of an effective feeding and at least 8 effective feedings per day required/reviewed importance of monitoring infant diapers, the breastfeeding log, and minimum output each day/reviewed risks of unnecessary formula supplementation/reviewed strategies to transition to breastfeeding only/reviewed benefits and recommendations for rooming in/reviewed feeding on demand/by cue at least 8 times a day/reviewed indications of inadequate milk transfer that would require supplementation

## 2022-03-19 NOTE — CHART NOTE - NSCHARTNOTEFT_GEN_A_CORE
Asked to see a patient with c/o dizziness and lightheadedness.    S/P Primary C/S on 3/18/22, for Abnormal Fetal Status.  QBL - 402  Hx. Chorio, s/p Antibiotics  Hx. PEC with Severe Features. S/P Magnesium Sulfate  S/P Cont pulse Ox  VSS, Afebrile - 36.3, 77, 111/70, 98%    H & H - 9.8/30.4 ---> 8.5/25.2    Found patient in bed, with c/o dizziness and feeling like the room was spinning.  Patient stated that she also felt tired.  No c/o SOB, tachycardia,  or difficulty breathing.    Plan -  Will draw a STAT CBC and order Iron TID/Vit C daily and Senna as needed. Discussed with Dr. Lorenz. Will also add a CMP STAT and a 500 cc Fluid Bolus.  I did discuss the possibility of a Blood Transfusion, as the patient is  Anemic and Symptomatic. Continue routine Post Op Care and await lab results, and proceed accordingly. Signed out to the oncoming PGY1. Asked to see a patient with c/o dizziness and lightheadedness.    S/P Primary C/S on 3/18/22, for Abnormal Fetal Status.  QBL - 402  Hx. Chorio, s/p Antibiotics  Hx. PEC with Severe Features. S/P Magnesium Sulfate  S/P Cont pulse Ox  VSS, Afebrile - 36.4, 76, 118/75, 18, 99% (See Flow Sheet)    H & H - 9.8/30.4 ---> 8.5/25.2    Found patient in bed, with c/o dizziness and feeling like the room was spinning.  Patient stated that she also felt tired.  Color - Pale  No c/o SOB, tachycardia,  or difficulty breathing.    Plan -  Will draw a STAT CBC and order Iron TID/Vit C daily and Senna as needed. Discussed with Dr. Lorenz. Will also add a CMP STAT and a 500 cc Fluid Bolus.  I did discuss the possibility of a Blood Transfusion, as the patient is  Anemic and Symptomatic. The patient will consent to a Blood Transfusion should the need arise. Continue routine Post Op Care and await lab results, and proceed accordingly. Signed out to the oncoming PGY1.

## 2022-03-19 NOTE — PROVIDER CONTACT NOTE (OTHER) - BACKGROUND
Pt is post  from 3/18/2022 at 15:52 for pre-eclampsia. Per prior RN pt was desating to the 80s in PACU, but relieved with 2 liter O2 via nasal nicko to %

## 2022-03-19 NOTE — PROVIDER CONTACT NOTE (OTHER) - SITUATION
12/11/18 1300   Group 3   Start Time 1300   Stop Time 1345   Length (min) 45 min   Group Name Coping skills   Focus of Group Good Medicine   Attendance Present  (9)   Mood Indifferent   Affect Calm   Behavior/Socialization Withdrawn   Thought Process Tracking   Patient Response Quiet;Passive   Task Performance Follows directions   Group Notes Pt. attended coping skills group on good medicine. Pt. was quiet for the duration of group, but was able to complete his activity sheet.     BANG Pena    
Pt complained of feeling lightheaded, dizzy and looking pale.
Pt physically on 2 liter Oxygen and on continuous pulse Oxygenation without physician order.

## 2022-03-19 NOTE — PROVIDER CONTACT NOTE (OTHER) - BACKGROUND
Patient delivered via C/S 03/18/22 QbL of 420. Last H/H is 8.5/25.9. Patient delivered via C/S 03/18/22 QbL of 420. Last H/H this morning is 8.5/25.2

## 2022-03-19 NOTE — PROGRESS NOTE ADULT - ASSESSMENT
A/P: 32yo POD#1 s/p LTCS complicated by desaturation in PACU, PEC, and chorio.   Patient is stable and doing well post-operatively.      #Episodes of desaturation noted in PACU   - Evaluated at bedside at the time. Improves with use of IS. Concern for atelectasis   - Educated the patient on the importance of continued use of the IS during the recovery period   - Will wean O2 this morning     #Chorioamnionitis   - Tmax of 38.6@3:26p on 3/18. Afebrile overnight   - Pt s/p A/G/T   - To continue gentamycin for an additional 24 hours post op 2/2 to the emergent nature of the case.     #PEC   - Blood pressures appropriate for the post partum period   - F/U AM HELLP labs     #PP care  - Continue regular diet.  - Increase ambulation.  - Continue motrin, tylenol, oxycodone PRN for pain control  - F/u AM CBC    LETICIA Carrero MD  PGY-1

## 2022-03-20 LAB
ALBUMIN SERPL ELPH-MCNC: 2.1 G/DL — LOW (ref 3.3–5)
ALP SERPL-CCNC: 113 U/L — SIGNIFICANT CHANGE UP (ref 40–120)
ALT FLD-CCNC: 16 U/L — SIGNIFICANT CHANGE UP (ref 4–33)
ANION GAP SERPL CALC-SCNC: 9 MMOL/L — SIGNIFICANT CHANGE UP (ref 7–14)
APTT BLD: 26 SEC — LOW (ref 27–36.3)
AST SERPL-CCNC: 26 U/L — SIGNIFICANT CHANGE UP (ref 4–32)
BASOPHILS # BLD AUTO: 0.04 K/UL — SIGNIFICANT CHANGE UP (ref 0–0.2)
BASOPHILS NFR BLD AUTO: 0.3 % — SIGNIFICANT CHANGE UP (ref 0–2)
BILIRUB SERPL-MCNC: <0.2 MG/DL — SIGNIFICANT CHANGE UP (ref 0.2–1.2)
BLD GP AB SCN SERPL QL: NEGATIVE — SIGNIFICANT CHANGE UP
BUN SERPL-MCNC: 16 MG/DL — SIGNIFICANT CHANGE UP (ref 7–23)
CALCIUM SERPL-MCNC: 7.4 MG/DL — LOW (ref 8.4–10.5)
CHLORIDE SERPL-SCNC: 99 MMOL/L — SIGNIFICANT CHANGE UP (ref 98–107)
CO2 SERPL-SCNC: 21 MMOL/L — LOW (ref 22–31)
CREAT SERPL-MCNC: 0.98 MG/DL — SIGNIFICANT CHANGE UP (ref 0.5–1.3)
EGFR: 79 ML/MIN/1.73M2 — SIGNIFICANT CHANGE UP
EOSINOPHIL # BLD AUTO: 0.27 K/UL — SIGNIFICANT CHANGE UP (ref 0–0.5)
EOSINOPHIL NFR BLD AUTO: 1.9 % — SIGNIFICANT CHANGE UP (ref 0–6)
FIBRINOGEN PPP-MCNC: 656 MG/DL — HIGH (ref 330–520)
GLUCOSE SERPL-MCNC: 75 MG/DL — SIGNIFICANT CHANGE UP (ref 70–99)
HCT VFR BLD CALC: 23.4 % — LOW (ref 34.5–45)
HCT VFR BLD CALC: 28.6 % — LOW (ref 34.5–45)
HGB BLD-MCNC: 8 G/DL — LOW (ref 11.5–15.5)
HGB BLD-MCNC: 9.7 G/DL — LOW (ref 11.5–15.5)
IANC: 12.08 K/UL — HIGH (ref 1.5–8.5)
IMM GRANULOCYTES NFR BLD AUTO: 1 % — SIGNIFICANT CHANGE UP (ref 0–1.5)
INR BLD: <0.9 RATIO — SIGNIFICANT CHANGE UP (ref 0.88–1.16)
LDH SERPL L TO P-CCNC: 215 U/L — SIGNIFICANT CHANGE UP (ref 135–225)
LYMPHOCYTES # BLD AUTO: 0.97 K/UL — LOW (ref 1–3.3)
LYMPHOCYTES # BLD AUTO: 6.7 % — LOW (ref 13–44)
MAGNESIUM SERPL-MCNC: 3.6 MG/DL — HIGH (ref 1.6–2.6)
MAGNESIUM SERPL-MCNC: 4 MG/DL — HIGH (ref 1.6–2.6)
MAGNESIUM SERPL-MCNC: 4.4 MG/DL — HIGH (ref 1.6–2.6)
MCHC RBC-ENTMCNC: 28.6 PG — SIGNIFICANT CHANGE UP (ref 27–34)
MCHC RBC-ENTMCNC: 28.7 PG — SIGNIFICANT CHANGE UP (ref 27–34)
MCHC RBC-ENTMCNC: 33.9 GM/DL — SIGNIFICANT CHANGE UP (ref 32–36)
MCHC RBC-ENTMCNC: 34.2 GM/DL — SIGNIFICANT CHANGE UP (ref 32–36)
MCV RBC AUTO: 83.9 FL — SIGNIFICANT CHANGE UP (ref 80–100)
MCV RBC AUTO: 84.4 FL — SIGNIFICANT CHANGE UP (ref 80–100)
MONOCYTES # BLD AUTO: 0.95 K/UL — HIGH (ref 0–0.9)
MONOCYTES NFR BLD AUTO: 6.6 % — SIGNIFICANT CHANGE UP (ref 2–14)
NEUTROPHILS # BLD AUTO: 12.08 K/UL — HIGH (ref 1.8–7.4)
NEUTROPHILS NFR BLD AUTO: 83.5 % — HIGH (ref 43–77)
NRBC # BLD: 0 /100 WBCS — SIGNIFICANT CHANGE UP
NRBC # BLD: 0 /100 WBCS — SIGNIFICANT CHANGE UP
NRBC # FLD: 0.02 K/UL — HIGH
NRBC # FLD: 0.05 K/UL — HIGH
PLATELET # BLD AUTO: 131 K/UL — LOW (ref 150–400)
PLATELET # BLD AUTO: 158 K/UL — SIGNIFICANT CHANGE UP (ref 150–400)
POTASSIUM SERPL-MCNC: 4.5 MMOL/L — SIGNIFICANT CHANGE UP (ref 3.5–5.3)
POTASSIUM SERPL-SCNC: 4.5 MMOL/L — SIGNIFICANT CHANGE UP (ref 3.5–5.3)
PROT SERPL-MCNC: 4.4 G/DL — LOW (ref 6–8.3)
PROTHROM AB SERPL-ACNC: 10.3 SEC — LOW (ref 10.5–13.4)
RBC # BLD: 2.79 M/UL — LOW (ref 3.8–5.2)
RBC # BLD: 3.39 M/UL — LOW (ref 3.8–5.2)
RBC # FLD: 12.3 % — SIGNIFICANT CHANGE UP (ref 10.3–14.5)
RBC # FLD: 12.7 % — SIGNIFICANT CHANGE UP (ref 10.3–14.5)
RH IG SCN BLD-IMP: POSITIVE — SIGNIFICANT CHANGE UP
SODIUM SERPL-SCNC: 129 MMOL/L — LOW (ref 135–145)
URATE SERPL-MCNC: 7.7 MG/DL — HIGH (ref 2.5–7)
WBC # BLD: 14.46 K/UL — HIGH (ref 3.8–10.5)
WBC # BLD: 14.72 K/UL — HIGH (ref 3.8–10.5)
WBC # FLD AUTO: 14.46 K/UL — HIGH (ref 3.8–10.5)
WBC # FLD AUTO: 14.72 K/UL — HIGH (ref 3.8–10.5)

## 2022-03-20 RX ORDER — MAGNESIUM SULFATE 500 MG/ML
1 VIAL (ML) INJECTION
Qty: 40 | Refills: 0 | Status: DISCONTINUED | OUTPATIENT
Start: 2022-03-20 | End: 2022-03-20

## 2022-03-20 RX ORDER — OXYCODONE HYDROCHLORIDE 5 MG/1
5 TABLET ORAL
Refills: 0 | Status: DISCONTINUED | OUTPATIENT
Start: 2022-03-20 | End: 2022-03-22

## 2022-03-20 RX ADMIN — Medication 975 MILLIGRAM(S): at 15:15

## 2022-03-20 RX ADMIN — Medication 600 MILLIGRAM(S): at 01:24

## 2022-03-20 RX ADMIN — Medication 325 MILLIGRAM(S): at 21:26

## 2022-03-20 RX ADMIN — Medication 25 MILLIGRAM(S): at 05:44

## 2022-03-20 RX ADMIN — Medication 975 MILLIGRAM(S): at 08:40

## 2022-03-20 RX ADMIN — Medication 325 MILLIGRAM(S): at 05:45

## 2022-03-20 RX ADMIN — Medication 600 MILLIGRAM(S): at 12:25

## 2022-03-20 RX ADMIN — SIMETHICONE 80 MILLIGRAM(S): 80 TABLET, CHEWABLE ORAL at 12:25

## 2022-03-20 RX ADMIN — Medication 975 MILLIGRAM(S): at 14:45

## 2022-03-20 RX ADMIN — Medication 25 GM/HR: at 07:55

## 2022-03-20 RX ADMIN — Medication 600 MILLIGRAM(S): at 18:00

## 2022-03-20 RX ADMIN — Medication 975 MILLIGRAM(S): at 21:26

## 2022-03-20 RX ADMIN — Medication 25 GM/HR: at 19:32

## 2022-03-20 RX ADMIN — Medication 600 MILLIGRAM(S): at 02:24

## 2022-03-20 RX ADMIN — Medication 975 MILLIGRAM(S): at 22:00

## 2022-03-20 RX ADMIN — Medication 600 MILLIGRAM(S): at 18:31

## 2022-03-20 RX ADMIN — SENNA PLUS 1 TABLET(S): 8.6 TABLET ORAL at 18:02

## 2022-03-20 RX ADMIN — Medication 325 MILLIGRAM(S): at 12:25

## 2022-03-20 RX ADMIN — Medication 600 MILLIGRAM(S): at 13:00

## 2022-03-20 RX ADMIN — Medication 500 MILLIGRAM(S): at 14:50

## 2022-03-20 RX ADMIN — Medication 975 MILLIGRAM(S): at 05:44

## 2022-03-20 RX ADMIN — Medication 975 MILLIGRAM(S): at 08:10

## 2022-03-20 RX ADMIN — SIMETHICONE 80 MILLIGRAM(S): 80 TABLET, CHEWABLE ORAL at 18:02

## 2022-03-20 RX ADMIN — HEPARIN SODIUM 5000 UNIT(S): 5000 INJECTION INTRAVENOUS; SUBCUTANEOUS at 18:02

## 2022-03-20 RX ADMIN — HEPARIN SODIUM 5000 UNIT(S): 5000 INJECTION INTRAVENOUS; SUBCUTANEOUS at 05:45

## 2022-03-20 NOTE — PROGRESS NOTE ADULT - ASSESSMENT
A/P: 32yo POD#2 s/p pLTCS for cat II tracing, pregnancy c/b chorioamnionitis and postpartum course c/b sPEC on Mag sulfate, acute blood loss anemia.  Patient is stable and doing well post-operatively.      #sPEC/Mg   - meeting criteria for sPEC by elevated Cr of 1.14   - initiated on mag sulfate, to be continued for 24 hours for seizure prophylaxis       #POD 2   - Continue regular diet.  - Increase ambulation.  - Continue motrin, tylenol, oxycodone PRN for pain control.  Kaya Lopez MD PGY1 A/P: 32yo POD#2 s/p pLTCS for cat II tracing, pregnancy c/b chorioamnionitis and postpartum course c/b sPEC on Mag sulfate, acute blood loss anemia.  Patient is stable and doing well post-operatively.      #sPEC/Mg   - meeting criteria for sPEC by elevated Cr of 1.14   - initiated on mag sulfate, to be continued for 24 hours for seizure prophylaxis   - BPs ON well controlled   - HELLP labs ON show downtrending Cr, now 0.98  - continue to monitor     #acute blood loss anemia   - reporting dizziness, lightheadedness, occasional palpitations   - hgb of 7.8 on POD 1   - 1u pRBC ordered; repeat CBC 4 hour post-transfusion   - VSS   - continue to monitor     #Chorioamnionitis   - Tmax of 38.6@3:26p on 3/18. Afebrile overnight   - Pt s/p A/G/T/C    #POD 2   - Continue regular diet.  - Increase ambulation.  - Continue motrin, tylenol, oxycodone PRN for pain control.    Kaya Lopez MD PGY1

## 2022-03-21 ENCOUNTER — APPOINTMENT (OUTPATIENT)
Dept: OBGYN | Facility: CLINIC | Age: 32
End: 2022-03-21

## 2022-03-21 RX ADMIN — Medication 600 MILLIGRAM(S): at 13:02

## 2022-03-21 RX ADMIN — Medication 325 MILLIGRAM(S): at 21:26

## 2022-03-21 RX ADMIN — Medication 600 MILLIGRAM(S): at 12:02

## 2022-03-21 RX ADMIN — Medication 600 MILLIGRAM(S): at 05:40

## 2022-03-21 RX ADMIN — Medication 600 MILLIGRAM(S): at 06:14

## 2022-03-21 RX ADMIN — OXYCODONE HYDROCHLORIDE 5 MILLIGRAM(S): 5 TABLET ORAL at 00:56

## 2022-03-21 RX ADMIN — HEPARIN SODIUM 5000 UNIT(S): 5000 INJECTION INTRAVENOUS; SUBCUTANEOUS at 05:40

## 2022-03-21 RX ADMIN — SIMETHICONE 80 MILLIGRAM(S): 80 TABLET, CHEWABLE ORAL at 00:25

## 2022-03-21 RX ADMIN — SIMETHICONE 80 MILLIGRAM(S): 80 TABLET, CHEWABLE ORAL at 21:26

## 2022-03-21 RX ADMIN — OXYCODONE HYDROCHLORIDE 5 MILLIGRAM(S): 5 TABLET ORAL at 00:25

## 2022-03-21 RX ADMIN — Medication 975 MILLIGRAM(S): at 16:11

## 2022-03-21 RX ADMIN — Medication 975 MILLIGRAM(S): at 10:01

## 2022-03-21 RX ADMIN — Medication 500 MILLIGRAM(S): at 12:02

## 2022-03-21 RX ADMIN — Medication 600 MILLIGRAM(S): at 19:04

## 2022-03-21 RX ADMIN — Medication 975 MILLIGRAM(S): at 09:01

## 2022-03-21 RX ADMIN — Medication 600 MILLIGRAM(S): at 00:56

## 2022-03-21 RX ADMIN — Medication 975 MILLIGRAM(S): at 15:11

## 2022-03-21 RX ADMIN — Medication 600 MILLIGRAM(S): at 00:25

## 2022-03-21 RX ADMIN — Medication 600 MILLIGRAM(S): at 18:07

## 2022-03-21 RX ADMIN — Medication 325 MILLIGRAM(S): at 15:12

## 2022-03-21 RX ADMIN — Medication 325 MILLIGRAM(S): at 05:41

## 2022-03-21 RX ADMIN — Medication 975 MILLIGRAM(S): at 21:24

## 2022-03-21 RX ADMIN — Medication 975 MILLIGRAM(S): at 21:56

## 2022-03-21 RX ADMIN — HEPARIN SODIUM 5000 UNIT(S): 5000 INJECTION INTRAVENOUS; SUBCUTANEOUS at 18:07

## 2022-03-21 RX ADMIN — SENNA PLUS 1 TABLET(S): 8.6 TABLET ORAL at 05:41

## 2022-03-21 NOTE — PROGRESS NOTE ADULT - ASSESSMENT
A/P: 32yo POD#2 s/p pLTCS for cat II tracing, pregnancy c/b chorioamnionitis and postpartum course c/b sPEC and acute blood loss anemia.  Patient is stable and doing well post-operatively.      #sPEC/Mg   - met criteria for sPEC by elevated Cr of 1.14   - s/p Mag sulfate x24 hours for seizure prophylaxis    - BPs ON well controlled   - HELLP labs on POD2 show downtrending Cr with remaining labs wnl   - not currently on any anti-hypertensives   - continue to monitor; repeat labs PRN      #acute blood loss anemia   - reporting dizziness, lightheadedness, occasional palpitations with hgb of 7.8 on POD 1   - s/p 1u pRBC for symptomatic anemia with 4 hour post-transfusion CBC showing hgb 9.7   - VSS, no longer symptomatic    - continue to monitor, repeat labs PRN     #Chorioamnionitis   - Tmax of 38.6@3:26p on 3/18. Afebrile since   - Pt s/p A/G/T/C  - no c/f endometritis     #POD 3  - Continue regular diet.  - Increase ambulation.  - Continue motrin, tylenol, oxycodone PRN for pain control.     Kaya Lopez MD PGY1

## 2022-03-22 ENCOUNTER — TRANSCRIPTION ENCOUNTER (OUTPATIENT)
Age: 32
End: 2022-03-22

## 2022-03-22 VITALS
HEART RATE: 72 BPM | OXYGEN SATURATION: 100 % | RESPIRATION RATE: 18 BRPM | SYSTOLIC BLOOD PRESSURE: 145 MMHG | DIASTOLIC BLOOD PRESSURE: 88 MMHG | TEMPERATURE: 98 F

## 2022-03-22 RX ORDER — IBUPROFEN 200 MG
1 TABLET ORAL
Qty: 0 | Refills: 0 | DISCHARGE
Start: 2022-03-22

## 2022-03-22 RX ORDER — OXYCODONE HYDROCHLORIDE 5 MG/1
1 TABLET ORAL
Qty: 6 | Refills: 0
Start: 2022-03-22 | End: 2022-03-22

## 2022-03-22 RX ADMIN — OXYCODONE HYDROCHLORIDE 5 MILLIGRAM(S): 5 TABLET ORAL at 00:13

## 2022-03-22 RX ADMIN — Medication 600 MILLIGRAM(S): at 01:02

## 2022-03-22 RX ADMIN — Medication 325 MILLIGRAM(S): at 06:33

## 2022-03-22 RX ADMIN — Medication 600 MILLIGRAM(S): at 06:59

## 2022-03-22 RX ADMIN — OXYCODONE HYDROCHLORIDE 5 MILLIGRAM(S): 5 TABLET ORAL at 01:02

## 2022-03-22 RX ADMIN — HEPARIN SODIUM 5000 UNIT(S): 5000 INJECTION INTRAVENOUS; SUBCUTANEOUS at 06:25

## 2022-03-22 RX ADMIN — Medication 600 MILLIGRAM(S): at 00:14

## 2022-03-22 RX ADMIN — Medication 975 MILLIGRAM(S): at 09:03

## 2022-03-22 RX ADMIN — Medication 975 MILLIGRAM(S): at 08:29

## 2022-03-22 RX ADMIN — Medication 600 MILLIGRAM(S): at 06:25

## 2022-03-22 NOTE — DISCHARGE NOTE OB - CARE PROVIDER_API CALL
Carina Sr)  Obstetrics and Gynecology  [address] [facility], [address]  [place] [address]  Phone: [phone]  Fax: [phone]  Follow Up Time:

## 2022-03-22 NOTE — PROGRESS NOTE ADULT - ATTENDING COMMENTS
PAtient seen and examined  Feels weak and tired but improving  Vital signs stable and incision healing well.  Agree with above assessment  Will discharge her home tomorrow if stable
Pt seen and examined and agree with above  T(C): 37 (03-19-22 @ 06:00), Max: 38.5 (03-18-22 @ 14:46)  HR: 80 (03-19-22 @ 06:00) (64 - 102)  BP: 120/74 (03-19-22 @ 06:00) (115/66 - 151/88)  RR: 18 (03-19-22 @ 06:00) (16 - 22)  SpO2: 98% (03-19-22 @ 06:00) (79% - 100%)                            8.5    23.89 )-----------( 126      ( 19 Mar 2022 06:47 )             25.2   03-19    131<L>  |  100  |  14  ----------------------------<  94  5.0   |  21<L>  |  1.10    Ca    7.4<L>      19 Mar 2022 06:47    TPro  4.5<L>  /  Alb  2.2<L>  /  TBili  0.3  /  DBili  x   /  AST  28  /  ALT  17  /  AlkPhos  139<H>  03-19  LIVER FUNCTIONS - ( 19 Mar 2022 06:47 )  Alb: 2.2 g/dL / Pro: 4.5 g/dL / ALK PHOS: 139 U/L / ALT: 17 U/L / AST: 28 U/L / GGT: x         PT/INR - ( 19 Mar 2022 06:47 )   PT: 12.3 sec;   INR: 1.06 ratio         PTT - ( 19 Mar 2022 06:47 )  PTT:26.8 sec    Continue current management
Pt seen and examined and agree with above  D/c home today
Pt seen and examined and agree with above assessment and plan.  f/u post transfusion CBC and continue to monitor.

## 2022-03-22 NOTE — DISCHARGE NOTE OB - NS MD DC FALL RISK RISK
For information on Fall & Injury Prevention, visit: [email or web address] OR  [email or web address] OR  https://www.cdc.gov/steadi/patient.html

## 2022-03-22 NOTE — DISCHARGE NOTE OB - PATIENT PORTAL LINK FT
You can access the FollowMyHealth Patient Portal offered by  by registering at the following website: [email or web address]. By joining [employer]’s FollowMyHealth portal, you will also be able to view your health information using other applications (apps) compatible with our system.

## 2022-03-22 NOTE — DISCHARGE NOTE OB - MEDICATION SUMMARY - MEDICATIONS TO TAKE
I will START or STAY ON the medications listed below when I get home from the hospital:    ibuprofen 600 mg oral tablet  -- 1 tab(s) by mouth every 6 hours  -- Indication: For Labor and delivery, indication for care    oxyCODONE 5 mg oral tablet  -- 1 tab(s) by mouth every 4 to 6 hours, As Needed -Moderate to Severe Pain (4-10) MDD:6 tabs   -- Indication: For Labor and delivery, indication for care

## 2022-03-22 NOTE — DISCHARGE NOTE OB - CARE PLAN
Principal Discharge DX:	 delivery delivered  Assessment and plan of treatment:	follow up with Dr. Sr in 2 weeks  Secondary Diagnosis:	Chorioamnionitis  Secondary Diagnosis:	Anemia due to acute blood loss   1

## 2022-03-22 NOTE — PROGRESS NOTE ADULT - SUBJECTIVE AND OBJECTIVE BOX
OB Progress Note: LTCS, POD#2    S: 30yo POD#2 s/p pLTCS for cat II tracing, pregnancy c/b chorioamnionitis and postpartum course c/b sPEC on Mag sulfate, acute blood loss anemia. Pain is well controlled. She is tolerating a regular diet and passing flatus. She is voiding spontaneously, and ambulating without difficulty. Denies CP/SOB. Denies lightheadedness/dizziness. Denies N/V.    O:  Vitals:  Vital Signs Last 24 Hrs  T(C): 36.7 (20 Mar 2022 04:00), Max: 37 (19 Mar 2022 06:00)  T(F): 98 (20 Mar 2022 04:00), Max: 98.6 (19 Mar 2022 06:00)  HR: 77 (20 Mar 2022 04:00) (75 - 86)  BP: 123/71 (20 Mar 2022 04:00) (110/65 - 132/75)  BP(mean): 90 (19 Mar 2022 15:50) (90 - 90)  RR: 18 (20 Mar 2022 04:00) (15 - 19)  SpO2: 98% (20 Mar 2022 04:00) (96% - 100%)    MEDICATIONS  (STANDING):  acetaminophen     Tablet .. 975 milliGRAM(s) Oral once  acetaminophen     Tablet .. 975 milliGRAM(s) Oral <User Schedule>  ascorbic acid 500 milliGRAM(s) Oral daily  diphtheria/tetanus/pertussis (acellular) Vaccine (ADAcel) 0.5 milliLiter(s) IntraMuscular once  ferrous    sulfate 325 milliGRAM(s) Oral three times a day  heparin   Injectable 5000 Unit(s) SubCutaneous every 12 hours  ibuprofen  Tablet. 600 milliGRAM(s) Oral every 6 hours  lactated ringers. 1000 milliLiter(s) (125 mL/Hr) IV Continuous <Continuous>  lactated ringers. 1000 milliLiter(s) (75 mL/Hr) IV Continuous <Continuous>  magnesium sulfate Infusion 1 Gm/Hr (25 mL/Hr) IV Continuous <Continuous>  oxytocin Infusion 333.333 milliUNIT(s)/Min (1000 mL/Hr) IV Continuous <Continuous>  oxytocin Infusion 333.333 milliUNIT(s)/Min (1000 mL/Hr) IV Continuous <Continuous>      MEDICATIONS  (PRN):  diphenhydrAMINE 25 milliGRAM(s) Oral every 6 hours PRN Pruritus  diphenhydrAMINE 25 milliGRAM(s) Oral once PRN Rash and/or Itching  lanolin Ointment 1 Application(s) Topical every 6 hours PRN Sore Nipples  magnesium hydroxide Suspension 30 milliLiter(s) Oral two times a day PRN Constipation  oxyCODONE    IR 5 milliGRAM(s) Oral every 3 hours PRN Moderate to Severe Pain (4-10)  oxyCODONE    IR 5 milliGRAM(s) Oral once PRN Moderate to Severe Pain (4-10)  senna 1 Tablet(s) Oral two times a day PRN Constipation  simethicone 80 milliGRAM(s) Chew every 4 hours PRN Gas      Labs:  Blood type: A Positive  Rubella IgG: RPR: Negative                          8.0<L>   14.46<H> >-----------< 131<L>    ( 03-20 @ 04:26 )             23.4<L>                        7.8<L>   17.07<H> >-----------< 116<L>    ( 03-19 @ 19:08 )             23.8<L>                        8.5<L>   23.89<H> >-----------< 126<L>    ( 03-19 @ 06:47 )             25.2<L>                        8.9<L>   25.12<H> >-----------< 116<L>    ( 03-18 @ 21:22 )             26.3<L>                        9.4<L>   15.55<H> >-----------< 113<L>    ( 03-18 @ 10:51 )             28.4<L>                        10.1<L>   11.49<H> >-----------< 120<L>    ( 03-17 @ 22:26 )             30.0<L>                        9.8<L>   8.19 >-----------< 113<L>    ( 03-17 @ 09:47 )             30.2<L>    03-20-22 @ 04:26      129<L>  |  99  |  16  ----------------------------<  75  4.5   |  21<L>  |  0.98    03-19-22 @ 19:08      126<L>  |  97<L>  |  16  ----------------------------<  89  4.6   |  19<L>  |  1.14    03-19-22 @ 06:47      131<L>  |  100  |  14  ----------------------------<  94  5.0   |  21<L>  |  1.10    03-18-22 @ 21:22      133<L>  |  104  |  14  ----------------------------<  121<H>  4.7   |  19<L>  |  1.05    03-18-22 @ 10:51      133<L>  |  104  |  14  ----------------------------<  77  4.7   |  19<L>  |  1.03    03-17-22 @ 09:47      136  |  106  |  15  ----------------------------<  72  4.4   |  18<L>  |  0.81        Ca    7.4<L>      20 Mar 2022 04:26  Ca    7.6<L>      19 Mar 2022 19:08  Ca    7.4<L>      19 Mar 2022 06:47  Ca    7.6<L>      18 Mar 2022 21:22  Ca    8.2<L>      18 Mar 2022 10:51  Ca    8.8      17 Mar 2022 09:47  Mg     3.60<H>     03-20    TPro  4.4<L>  /  Alb  2.1<L>  /  TBili  <0.2  /  DBili  x   /  AST  26  /  ALT  16  /  AlkPhos  113  03-20-22 @ 04:26  TPro  4.3<L>  /  Alb  2.0<L>  /  TBili  <0.2  /  DBili  x   /  AST  29  /  ALT  16  /  AlkPhos  121<H>  03-19-22 @ 19:08  TPro  4.5<L>  /  Alb  2.2<L>  /  TBili  0.3  /  DBili  x   /  AST  28  /  ALT  17  /  AlkPhos  139<H>  03-19-22 @ 06:47  TPro  4.8<L>  /  Alb  2.2<L>  /  TBili  0.5  /  DBili  x   /  AST  31  /  ALT  18  /  AlkPhos  155<H>  03-18-22 @ 21:22  TPro  5.1<L>  /  Alb  2.5<L>  /  TBili  0.4  /  DBili  x   /  AST  27  /  ALT  22  /  AlkPhos  180<H>  03-18-22 @ 10:51  TPro  5.4<L>  /  Alb  2.9<L>  /  TBili  <0.2  /  DBili  x   /  AST  27  /  ALT  25  /  AlkPhos  187<H>  03-17-22 @ 09:47          PE:  General: NAD  Abdomen: Soft, appropriately tender, incision c/d/i. Distended, tympanic to percussion   Extremities: No erythema. 2+ pitting edema     
OB Progress Note: LTCS, POD#3    S: 32yo POD#3 s/p pLTCS for cat II tracing, pregnancy c/b chorioamnionitis and postpartum course c/b sPEC and acute blood loss anemia. Pain is well controlled. She is tolerating a regular diet and passing flatus. Had bowel movement last night. She is voiding spontaneously, and ambulating without difficulty. Denies CP/SOB. Denies lightheadedness/dizziness. Denies N/V. States that she is overall feeling much better than on POD 1-2    O:  Vitals:  Vital Signs Last 24 Hrs  T(C): 37.1 (21 Mar 2022 05:26), Max: 37.1 (20 Mar 2022 21:20)  T(F): 98.8 (21 Mar 2022 05:26), Max: 98.8 (20 Mar 2022 21:20)  HR: 75 (21 Mar 2022 05:26) (70 - 76)  BP: 138/87 (21 Mar 2022 05:26) (123/77 - 138/87)  BP(mean): --  RR: 18 (21 Mar 2022 05:26) (18 - 20)  SpO2: 98% (21 Mar 2022 05:26) (96% - 99%)    MEDICATIONS  (STANDING):  acetaminophen     Tablet .. 975 milliGRAM(s) Oral <User Schedule>  ascorbic acid 500 milliGRAM(s) Oral daily  diphtheria/tetanus/pertussis (acellular) Vaccine (ADAcel) 0.5 milliLiter(s) IntraMuscular once  ferrous    sulfate 325 milliGRAM(s) Oral three times a day  heparin   Injectable 5000 Unit(s) SubCutaneous every 12 hours  ibuprofen  Tablet. 600 milliGRAM(s) Oral every 6 hours  lactated ringers. 1000 milliLiter(s) (125 mL/Hr) IV Continuous <Continuous>  lactated ringers. 1000 milliLiter(s) (75 mL/Hr) IV Continuous <Continuous>  oxytocin Infusion 333.333 milliUNIT(s)/Min (1000 mL/Hr) IV Continuous <Continuous>  oxytocin Infusion 333.333 milliUNIT(s)/Min (1000 mL/Hr) IV Continuous <Continuous>      MEDICATIONS  (PRN):  diphenhydrAMINE 25 milliGRAM(s) Oral every 6 hours PRN Pruritus  lanolin Ointment 1 Application(s) Topical every 6 hours PRN Sore Nipples  magnesium hydroxide Suspension 30 milliLiter(s) Oral two times a day PRN Constipation  oxyCODONE    IR 5 milliGRAM(s) Oral every 3 hours PRN Moderate to Severe Pain (4-10)  oxyCODONE    IR 5 milliGRAM(s) Oral once PRN Moderate to Severe Pain (4-10)  senna 1 Tablet(s) Oral two times a day PRN Constipation  simethicone 80 milliGRAM(s) Chew every 4 hours PRN Gas      Labs:  Blood type: A Positive  Rubella IgG: RPR: Negative                          9.7<L>   14.72<H> >-----------< 158    ( 03-20 @ 16:38 )             28.6<L>                        8.0<L>   14.46<H> >-----------< 131<L>    ( 03-20 @ 04:26 )             23.4<L>                        7.8<L>   17.07<H> >-----------< 116<L>    ( 03-19 @ 19:08 )             23.8<L>                        8.5<L>   23.89<H> >-----------< 126<L>    ( 03-19 @ 06:47 )             25.2<L>                        8.9<L>   25.12<H> >-----------< 116<L>    ( 03-18 @ 21:22 )             26.3<L>                        9.4<L>   15.55<H> >-----------< 113<L>    ( 03-18 @ 10:51 )             28.4<L>    03-20-22 @ 04:26      129<L>  |  99  |  16  ----------------------------<  75  4.5   |  21<L>  |  0.98    03-19-22 @ 19:08      126<L>  |  97<L>  |  16  ----------------------------<  89  4.6   |  19<L>  |  1.14    03-19-22 @ 06:47      131<L>  |  100  |  14  ----------------------------<  94  5.0   |  21<L>  |  1.10    03-18-22 @ 21:22      133<L>  |  104  |  14  ----------------------------<  121<H>  4.7   |  19<L>  |  1.05    03-18-22 @ 10:51      133<L>  |  104  |  14  ----------------------------<  77  4.7   |  19<L>  |  1.03        Ca    7.4<L>      20 Mar 2022 04:26  Ca    7.6<L>      19 Mar 2022 19:08  Ca    7.4<L>      19 Mar 2022 06:47  Ca    7.6<L>      18 Mar 2022 21:22  Ca    8.2<L>      18 Mar 2022 10:51  Mg     4.40<H>     03-20  Mg     4.00<H>     03-20  Mg     3.60<H>     03-20    TPro  4.4<L>  /  Alb  2.1<L>  /  TBili  <0.2  /  DBili  x   /  AST  26  /  ALT  16  /  AlkPhos  113  03-20-22 @ 04:26  TPro  4.3<L>  /  Alb  2.0<L>  /  TBili  <0.2  /  DBili  x   /  AST  29  /  ALT  16  /  AlkPhos  121<H>  03-19-22 @ 19:08  TPro  4.5<L>  /  Alb  2.2<L>  /  TBili  0.3  /  DBili  x   /  AST  28  /  ALT  17  /  AlkPhos  139<H>  03-19-22 @ 06:47  TPro  4.8<L>  /  Alb  2.2<L>  /  TBili  0.5  /  DBili  x   /  AST  31  /  ALT  18  /  AlkPhos  155<H>  03-18-22 @ 21:22  TPro  5.1<L>  /  Alb  2.5<L>  /  TBili  0.4  /  DBili  x   /  AST  27  /  ALT  22  /  AlkPhos  180<H>  03-18-22 @ 10:51          PE:  General: NAD  Abdomen: Soft, appropriately tender, incision c/d/i.  Extremities: No erythema, 2+ pitting edema to level of knees but improving relative to POD 2     
labor progress note:  PAtient seen and examined.  Comfortable with epidural  FHR tracing with moderate variability and baseline heart rate between 155-160 per min  Patient on high dose of pitocin and will continue to monitor for progress.  
PAtient seen and examined.  Patient has had labor induced for several hours with no progress inspite of high doses of pitoicin. FHR tracing with decreased variability and mild tachycardia. PAtient had intrapartum fever and was  started on antibiotics  In view of the lack of progress in this setup will deliver by  section  Plan of care discussed with her 
S: 30yo POD#4 s/p LTCS complicated by chorioamnionitis and PEC. The patient feels well. Denies F/C. Pain is well controlled. She is tolerating a regular diet and passing flatus. She is voiding spontaneously, and ambulating without difficulty. Denies CP/SOB. Denies lightheadedness/dizziness. Denies N/V. Denies HA/ vision changes/ RUQ pain.     O:  Vitals:  Vital Signs Last 24 Hrs  T(C): 36.6 (22 Mar 2022 01:47), Max: 37.1 (21 Mar 2022 05:26)  T(F): 97.8 (22 Mar 2022 01:47), Max: 98.8 (21 Mar 2022 05:26)  HR: 72 (22 Mar 2022 01:47) (65 - 81)  BP: 147/80 (22 Mar 2022 01:47) (134/80 - 158/90)  BP(mean): --  RR: 18 (22 Mar 2022 01:47) (18 - 18)  SpO2: 99% (22 Mar 2022 01:47) (98% - 100%)    MEDICATIONS  (STANDING):  acetaminophen     Tablet .. 975 milliGRAM(s) Oral <User Schedule>  ascorbic acid 500 milliGRAM(s) Oral daily  diphtheria/tetanus/pertussis (acellular) Vaccine (ADAcel) 0.5 milliLiter(s) IntraMuscular once  ferrous    sulfate 325 milliGRAM(s) Oral three times a day  heparin   Injectable 5000 Unit(s) SubCutaneous every 12 hours  ibuprofen  Tablet. 600 milliGRAM(s) Oral every 6 hours  lactated ringers. 1000 milliLiter(s) (125 mL/Hr) IV Continuous <Continuous>  lactated ringers. 1000 milliLiter(s) (75 mL/Hr) IV Continuous <Continuous>  oxytocin Infusion 333.333 milliUNIT(s)/Min (1000 mL/Hr) IV Continuous <Continuous>  oxytocin Infusion 333.333 milliUNIT(s)/Min (1000 mL/Hr) IV Continuous <Continuous>    MEDICATIONS  (PRN):  diphenhydrAMINE 25 milliGRAM(s) Oral every 6 hours PRN Pruritus  lanolin Ointment 1 Application(s) Topical every 6 hours PRN Sore Nipples  magnesium hydroxide Suspension 30 milliLiter(s) Oral two times a day PRN Constipation  oxyCODONE    IR 5 milliGRAM(s) Oral every 3 hours PRN Moderate to Severe Pain (4-10)  oxyCODONE    IR 5 milliGRAM(s) Oral once PRN Moderate to Severe Pain (4-10)  senna 1 Tablet(s) Oral two times a day PRN Constipation  simethicone 80 milliGRAM(s) Chew every 4 hours PRN Gas      LABS:  Blood type: A Positive  Rubella IgG: RPR: Negative                          9.7<L>   14.72<H> >-----------< 158    ( 03-20 @ 16:38 )             28.6<L>                        8.0<L>   14.46<H> >-----------< 131<L>    ( 03-20 @ 04:26 )             23.4<L>                        7.8<L>   17.07<H> >-----------< 116<L>    ( 03-19 @ 19:08 )             23.8<L>                        8.5<L>   23.89<H> >-----------< 126<L>    ( 03-19 @ 06:47 )             25.2<L>    03-20-22 @ 04:26      129<L>  |  99  |  16  ----------------------------<  75  4.5   |  21<L>  |  0.98    03-19-22 @ 19:08      126<L>  |  97<L>  |  16  ----------------------------<  89  4.6   |  19<L>  |  1.14    03-19-22 @ 06:47      131<L>  |  100  |  14  ----------------------------<  94  5.0   |  21<L>  |  1.10        Ca    7.4<L>      20 Mar 2022 04:26  Ca    7.6<L>      19 Mar 2022 19:08  Ca    7.4<L>      19 Mar 2022 06:47  Mg     4.40<H>     03-20  Mg     4.00<H>     03-20  Mg     3.60<H>     03-20    TPro  4.4<L>  /  Alb  2.1<L>  /  TBili  <0.2  /  DBili  x   /  AST  26  /  ALT  16  /  AlkPhos  113  03-20-22 @ 04:26  TPro  4.3<L>  /  Alb  2.0<L>  /  TBili  <0.2  /  DBili  x   /  AST  29  /  ALT  16  /  AlkPhos  121<H>  03-19-22 @ 19:08  TPro  4.5<L>  /  Alb  2.2<L>  /  TBili  0.3  /  DBili  x   /  AST  28  /  ALT  17  /  AlkPhos  139<H>  03-19-22 @ 06:47          Physical exam:  Gen: NAD  Abdomen: Soft, nontender, no distension , firm uterine fundus at umbilicus.  Incision: Clean, dry, and intact   Pelvic: Normal lochia noted  Ext: No calf tenderness      
S: 32yo POD#1 s/p LTCS complicated by chorio and PEC. Her pain is well controlled. She is tolerating a regular diet, but is passing flatus. Denies N/V.  White was removed 1H prior to discussion and patient has not yet voided or been out of been. NC in place. Denies dyspnea/SOB/ palpitations. Pt states that she has not been using the IS since coming to the post partum floor.  Denies HA/ vision changes/ RUQ pain.     O:   Vital Signs Last 24 Hrs  T(C): 36.4 (19 Mar 2022 02:00), Max: 38.5 (18 Mar 2022 14:46)  T(F): 97.5 (19 Mar 2022 02:00), Max: 101.3 (18 Mar 2022 14:46)  HR: 76 (19 Mar 2022 02:00) (64 - 109)  BP: 119/79 (19 Mar 2022 02:00) (115/66 - 153/90)  BP(mean): 98 (18 Mar 2022 23:00) (78 - 104)  RR: 18 (19 Mar 2022 02:00) (16 - 22)  SpO2: 96% (19 Mar 2022 02:00) (79% - 100%)    Labs:  Blood type: A Positive  Rubella IgG: RPR: Negative                          8.9<L>   25.12<H> >-----------< 116<L>    ( 03-18 @ 21:22 )             26.3<L>                        9.4<L>   15.55<H> >-----------< 113<L>    ( 03-18 @ 10:51 )             28.4<L>                        10.1<L>   11.49<H> >-----------< 120<L>    ( 03-17 @ 22:26 )             30.0<L>                        9.8<L>   8.19 >-----------< 113<L>    ( 03-17 @ 09:47 )             30.2<L>                        9.8<L>   8.85 >-----------< 143<L>    ( 03-16 @ 22:12 )             30.4<L>    03-18-22 @ 21:22      133<L>  |  104  |  14  ----------------------------<  121<H>  4.7   |  19<L>  |  1.05    03-18-22 @ 10:51      133<L>  |  104  |  14  ----------------------------<  77  4.7   |  19<L>  |  1.03    03-17-22 @ 09:47      136  |  106  |  15  ----------------------------<  72  4.4   |  18<L>  |  0.81    03-16-22 @ 22:12      139  |  108<H>  |  15  ----------------------------<  79  4.3   |  19<L>  |  0.74        Ca    7.6<L>      18 Mar 2022 21:22  Ca    8.2<L>      18 Mar 2022 10:51  Ca    8.8      17 Mar 2022 09:47  Ca    9.5      16 Mar 2022 22:12    TPro  4.8<L>  /  Alb  2.2<L>  /  TBili  0.5  /  DBili  x   /  AST  31  /  ALT  18  /  AlkPhos  155<H>  03-18-22 @ 21:22  TPro  5.1<L>  /  Alb  2.5<L>  /  TBili  0.4  /  DBili  x   /  AST  27  /  ALT  22  /  AlkPhos  180<H>  03-18-22 @ 10:51  TPro  5.4<L>  /  Alb  2.9<L>  /  TBili  <0.2  /  DBili  x   /  AST  27  /  ALT  25  /  AlkPhos  187<H>  03-17-22 @ 09:47  TPro  5.7<L>  /  Alb  3.0<L>  /  TBili  <0.2  /  DBili  x   /  AST  30  /  ALT  23  /  AlkPhos  189<H>  03-16-22 @ 22:12          acetaminophen     Tablet .. 975 milliGRAM(s) Oral <User Schedule>  diphenhydrAMINE 25 milliGRAM(s) Oral every 6 hours PRN  diphtheria/tetanus/pertussis (acellular) Vaccine (ADAcel) 0.5 milliLiter(s) IntraMuscular once  gentamicin   IVPB 310 milliGRAM(s) IV Intermittent every 24 hours  heparin   Injectable 5000 Unit(s) SubCutaneous every 12 hours  ibuprofen  Tablet. 600 milliGRAM(s) Oral every 6 hours  ketorolac   Injectable 30 milliGRAM(s) IV Push every 6 hours  lactated ringers. 1000 milliLiter(s) IV Continuous <Continuous>  lanolin Ointment 1 Application(s) Topical every 6 hours PRN  magnesium hydroxide Suspension 30 milliLiter(s) Oral two times a day PRN  oxyCODONE    IR 5 milliGRAM(s) Oral once PRN  oxyCODONE    IR 5 milliGRAM(s) Oral every 3 hours PRN  oxytocin Infusion 333.333 milliUNIT(s)/Min IV Continuous <Continuous>  oxytocin Infusion 333.333 milliUNIT(s)/Min IV Continuous <Continuous>  simethicone 80 milliGRAM(s) Chew every 4 hours PRN      PE:  General: NAD  Abdomen: Mildly distended, appropriately tender, incision c/d/i.  Extremities: No erythema, no pitting edema

## 2022-03-22 NOTE — PROGRESS NOTE ADULT - ASSESSMENT
A/P: 32yo  POD#3 s/p LTCS complicated by chorioamnionitis and PEC.  Patient is stable and is doing well post-operatively.    #Chorioamnionitis   - Tmax 38.5@330p on 3/18. Afebrile overnight   - s/p A/G/T   - No signs and symptoms of endometritis     #Surgical blood loss   - Positive orthostatics on POD#1. Symptomatic anemia   - pt s/p 1u pRBC with appropriate rise in Hct     #PEC   - Blood pressure 130-140/70-80 overnight. Appropriate for the post partum period.     #PP care  - Continue motrin, tylenol, oxycodone PRN for pain control.  - Increase ambulation  - Continue regular diet  - Discharge planning    LETICIA Carrero MD  PGY-1

## 2022-03-24 LAB
CULTURE RESULTS: SIGNIFICANT CHANGE UP
CULTURE RESULTS: SIGNIFICANT CHANGE UP
SPECIMEN SOURCE: SIGNIFICANT CHANGE UP
SPECIMEN SOURCE: SIGNIFICANT CHANGE UP

## 2022-03-28 ENCOUNTER — APPOINTMENT (OUTPATIENT)
Dept: OBGYN | Facility: CLINIC | Age: 32
End: 2022-03-28

## 2022-03-29 ENCOUNTER — APPOINTMENT (OUTPATIENT)
Dept: OBGYN | Facility: CLINIC | Age: 32
End: 2022-03-29
Payer: COMMERCIAL

## 2022-03-29 VITALS
SYSTOLIC BLOOD PRESSURE: 120 MMHG | WEIGHT: 139 LBS | HEIGHT: 63 IN | DIASTOLIC BLOOD PRESSURE: 70 MMHG | BODY MASS INDEX: 24.63 KG/M2

## 2022-03-29 PROCEDURE — 0502F SUBSEQUENT PRENATAL CARE: CPT

## 2022-04-06 ENCOUNTER — APPOINTMENT (OUTPATIENT)
Dept: OBGYN | Facility: CLINIC | Age: 32
End: 2022-04-06

## 2022-04-11 ENCOUNTER — APPOINTMENT (OUTPATIENT)
Dept: OBGYN | Facility: CLINIC | Age: 32
End: 2022-04-11

## 2022-04-25 ENCOUNTER — APPOINTMENT (OUTPATIENT)
Dept: OBGYN | Facility: CLINIC | Age: 32
End: 2022-04-25
Payer: COMMERCIAL

## 2022-04-25 PROCEDURE — 0503F POSTPARTUM CARE VISIT: CPT

## 2022-08-15 ENCOUNTER — APPOINTMENT (OUTPATIENT)
Dept: OBGYN | Facility: CLINIC | Age: 32
End: 2022-08-15

## 2022-08-15 VITALS
WEIGHT: 134 LBS | DIASTOLIC BLOOD PRESSURE: 64 MMHG | BODY MASS INDEX: 23.74 KG/M2 | SYSTOLIC BLOOD PRESSURE: 110 MMHG | HEIGHT: 63 IN

## 2022-08-15 DIAGNOSIS — Z3A.24 24 WEEKS GESTATION OF PREGNANCY: ICD-10-CM

## 2022-08-15 DIAGNOSIS — O28.3 ABNORMAL ULTRASONIC FINDING ON ANTENATAL SCREENING OF MOTHER: ICD-10-CM

## 2022-08-15 DIAGNOSIS — Z3A.36 36 WEEKS GESTATION OF PREGNANCY: ICD-10-CM

## 2022-08-15 DIAGNOSIS — Z3A.18 18 WEEKS GESTATION OF PREGNANCY: ICD-10-CM

## 2022-08-15 DIAGNOSIS — Z01.419 ENCOUNTER FOR GYNECOLOGICAL EXAMINATION (GENERAL) (ROUTINE) W/OUT ABNORMAL FINDINGS: ICD-10-CM

## 2022-08-15 DIAGNOSIS — Z87.42 PERSONAL HISTORY OF OTHER DISEASES OF THE FEMALE GENITAL TRACT: ICD-10-CM

## 2022-08-15 DIAGNOSIS — Z3A.21 21 WEEKS GESTATION OF PREGNANCY: ICD-10-CM

## 2022-08-15 DIAGNOSIS — Z11.3 ENCOUNTER FOR SCREENING FOR INFECTIONS WITH A PREDOMINANTLY SEXUAL MODE OF TRANSMISSION: ICD-10-CM

## 2022-08-15 DIAGNOSIS — Z3A.29 29 WEEKS GESTATION OF PREGNANCY: ICD-10-CM

## 2022-08-15 DIAGNOSIS — Z3A.13 13 WEEKS GESTATION OF PREGNANCY: ICD-10-CM

## 2022-08-15 DIAGNOSIS — Z3A.31 31 WEEKS GESTATION OF PREGNANCY: ICD-10-CM

## 2022-08-15 DIAGNOSIS — Z3A.25 25 WEEKS GESTATION OF PREGNANCY: ICD-10-CM

## 2022-08-15 DIAGNOSIS — Z36.85 ENCOUNTER FOR ANTENATAL SCREENING FOR STREPTOCOCCUS B: ICD-10-CM

## 2022-08-15 DIAGNOSIS — Z80.0 FAMILY HISTORY OF MALIGNANT NEOPLASM OF DIGESTIVE ORGANS: ICD-10-CM

## 2022-08-15 DIAGNOSIS — Z3A.17 17 WEEKS GESTATION OF PREGNANCY: ICD-10-CM

## 2022-08-15 DIAGNOSIS — Z3A.34 34 WEEKS GESTATION OF PREGNANCY: ICD-10-CM

## 2022-08-15 PROCEDURE — 99395 PREV VISIT EST AGE 18-39: CPT

## 2022-08-22 PROBLEM — Z11.3 SCREEN FOR STD (SEXUALLY TRANSMITTED DISEASE): Status: RESOLVED | Noted: 2021-08-31 | Resolved: 2022-08-22

## 2022-08-22 PROBLEM — Z36.85 ENCOUNTER FOR ANTENATAL SCREENING FOR STREPTOCOCCUS B: Status: RESOLVED | Noted: 2022-03-14 | Resolved: 2022-08-22

## 2022-08-22 PROBLEM — Z3A.17 17 WEEKS GESTATION OF PREGNANCY: Status: RESOLVED | Noted: 2021-11-01 | Resolved: 2022-08-22

## 2022-08-22 PROBLEM — Z3A.21 21 WEEKS GESTATION OF PREGNANCY: Status: RESOLVED | Noted: 2021-11-29 | Resolved: 2022-08-22

## 2022-08-22 PROBLEM — Z80.0 FAMILY HISTORY OF MALIGNANT NEOPLASM OF COLON: Status: ACTIVE | Noted: 2022-08-15

## 2022-08-22 PROBLEM — Z3A.34 34 WEEKS GESTATION OF PREGNANCY: Status: RESOLVED | Noted: 2022-02-28 | Resolved: 2022-08-22

## 2022-08-22 PROBLEM — Z3A.31 31 WEEKS GESTATION OF PREGNANCY: Status: RESOLVED | Noted: 2022-02-07 | Resolved: 2022-08-22

## 2022-08-22 PROBLEM — Z3A.25 25 WEEKS GESTATION OF PREGNANCY: Status: RESOLVED | Noted: 2021-12-27 | Resolved: 2022-08-22

## 2022-08-22 PROBLEM — Z3A.24 24 WEEKS GESTATION OF PREGNANCY: Status: RESOLVED | Noted: 2021-12-27 | Resolved: 2022-08-22

## 2022-08-22 PROBLEM — Z3A.13 13 WEEKS GESTATION OF PREGNANCY: Status: RESOLVED | Noted: 2021-10-04 | Resolved: 2022-08-22

## 2022-08-22 PROBLEM — Z3A.18 18 WEEKS GESTATION OF PREGNANCY: Status: RESOLVED | Noted: 2021-10-19 | Resolved: 2022-08-22

## 2022-08-22 PROBLEM — Z3A.36 36 WEEKS GESTATION OF PREGNANCY: Status: RESOLVED | Noted: 2021-10-04 | Resolved: 2022-08-22

## 2022-08-22 PROBLEM — Z3A.29 29 WEEKS GESTATION OF PREGNANCY: Status: RESOLVED | Noted: 2022-01-24 | Resolved: 2022-08-22

## 2022-08-22 PROBLEM — O28.3 ABNORMAL PRENATAL ULTRASOUND: Status: RESOLVED | Noted: 2021-11-16 | Resolved: 2022-08-22

## 2022-08-22 PROBLEM — Z87.42 HISTORY OF AMENORRHEA: Status: RESOLVED | Noted: 2021-08-31 | Resolved: 2022-08-22

## 2022-08-29 ENCOUNTER — APPOINTMENT (OUTPATIENT)
Dept: ULTRASOUND IMAGING | Facility: CLINIC | Age: 32
End: 2022-08-29

## 2022-08-29 ENCOUNTER — RESULT REVIEW (OUTPATIENT)
Age: 32
End: 2022-08-29

## 2022-08-29 ENCOUNTER — OUTPATIENT (OUTPATIENT)
Dept: OUTPATIENT SERVICES | Facility: HOSPITAL | Age: 32
LOS: 1 days | End: 2022-08-29
Payer: COMMERCIAL

## 2022-08-29 DIAGNOSIS — Z98.890 OTHER SPECIFIED POSTPROCEDURAL STATES: Chronic | ICD-10-CM

## 2022-08-29 DIAGNOSIS — R10.2 PELVIC AND PERINEAL PAIN: ICD-10-CM

## 2022-08-29 LAB — CYTOLOGY CVX/VAG DOC THIN PREP: NORMAL

## 2022-08-29 PROCEDURE — 76856 US EXAM PELVIC COMPLETE: CPT

## 2022-08-29 PROCEDURE — 76830 TRANSVAGINAL US NON-OB: CPT | Mod: 26

## 2022-08-29 PROCEDURE — 76830 TRANSVAGINAL US NON-OB: CPT

## 2022-08-29 PROCEDURE — 76856 US EXAM PELVIC COMPLETE: CPT | Mod: 26

## 2022-09-08 ENCOUNTER — NON-APPOINTMENT (OUTPATIENT)
Age: 32
End: 2022-09-08

## 2022-11-07 ENCOUNTER — NON-APPOINTMENT (OUTPATIENT)
Age: 32
End: 2022-11-07

## 2022-11-08 ENCOUNTER — APPOINTMENT (OUTPATIENT)
Dept: UROGYNECOLOGY | Facility: CLINIC | Age: 32
End: 2022-11-08

## 2022-11-08 VITALS
DIASTOLIC BLOOD PRESSURE: 83 MMHG | SYSTOLIC BLOOD PRESSURE: 131 MMHG | BODY MASS INDEX: 24.45 KG/M2 | HEART RATE: 89 BPM | TEMPERATURE: 98.7 F | HEIGHT: 63 IN | OXYGEN SATURATION: 97 % | WEIGHT: 138 LBS

## 2022-11-08 DIAGNOSIS — R35.0 FREQUENCY OF MICTURITION: ICD-10-CM

## 2022-11-08 LAB
BILIRUB UR QL STRIP: NORMAL
CLARITY UR: CLEAR
COLLECTION METHOD: NORMAL
GLUCOSE UR-MCNC: NORMAL
HCG UR QL: NORMAL EU/DL
HGB UR QL STRIP.AUTO: NORMAL
KETONES UR-MCNC: NORMAL
LEUKOCYTE ESTERASE UR QL STRIP: NORMAL
NITRITE UR QL STRIP: NORMAL
PH UR STRIP: 7.5
PROT UR STRIP-MCNC: NORMAL
SP GR UR STRIP: 1.02

## 2022-11-08 PROCEDURE — 51701 INSERT BLADDER CATHETER: CPT

## 2022-11-08 PROCEDURE — 99204 OFFICE O/P NEW MOD 45 MIN: CPT | Mod: 25

## 2022-11-08 PROCEDURE — 81003 URINALYSIS AUTO W/O SCOPE: CPT | Mod: QW

## 2022-11-08 NOTE — REASON FOR VISIT
[Initial Visit ___] : an initial visit for [unfilled] [Questionnaire Received] : Patient questionnaire received [Pelvic Pain] : pelvic pain [Intake Form Reviewed] : Patient intake form with past medical history, surgical history, family history and social history reviewed today [Urinary Incontinence] : urinary incontinence

## 2022-11-08 NOTE — HISTORY OF PRESENT ILLNESS
[Cystocele (Obstetric)] : no [Vaginal Wall Prolapse] : no [Rectal Prolapse] : no [Unable To Restrain Bowel Movement] : no [Urinary Frequency More Than Twice At Night (Nocturia)] : no nocturia [Urinary Frequency] : no [Feelings Of Urinary Urgency] : no [Pain During Urination (Dysuria)] : no [Urinary Tract Infection] : no [Constipation Obstructed Defecation] : no [Stool Visible Blood] : no [Incomplete Emptying Of Stool] : no [Pelvic Pain] : no [Rectal Pain] : no [] : no [de-identified] : Few times a month [de-identified] : 8-10x/d [de-identified] : Daily, aching pain [de-identified] : Discomfort with man on top but not with woman on top  [FreeTextEntry1] : \emerson Oswald is a 33yo who presents with pelvic pain for the last 6 months after her emergency  delivery. She was admitted for induction of labor and then had a CD for fetal indications. In addition, she had a peripartum hemorrhage and received a blood transfusion. She was discharged from the hospital on postop day #4. The pelvic pain is described as "aching" and is present daily but waxes and wanes. Pain is exacerbated by activity and alleviated by motrin. Pain is also worse the week prior to menstruation. No change in pain with urination, defecation. She is using condoms for contraception currently. In the past she had tried OCPs but did not like them because of the emotional side effects. She would like to get pregnant in the next few years. \par \par Prior imaging included a pelvic US and MRI. Multiple subcentimeter cysts seen along lower uterine segment on MRI and one larger ~2cm cyst. \par \par PMH: None\par PSH: CDx1, ganglion cysts removed from left wrist and right hand\par Soc: Nonsmoker, , employed as  in tech \par \par Daily fluid intake: 1c coffee + 40oz water. 1 seltzer ~1x/wk, 1 glass wine every few weeks.

## 2022-11-08 NOTE — PROCEDURE
[Straight Catheterization] : insertion of a straight catheter [Urinary Tract Infection] : a urinary tract infection [Stress Incontinence] : stress incontinence [___ Fr Straight Tip] : a [unfilled] in [place] straight tip catheter [None] : there were no complications with the catheter insertion [Clear] : clear [Other ____] :  [unfilled]

## 2022-11-08 NOTE — DISCUSSION/SUMMARY
[Reviewed Radiology Report(s)] : Radiology reports were reviewed. [Reviewed Radiology Film/Image(s)] : Images from radiology studies were reviewed and examined. [FreeTextEntry1] : \emerson Oswald presents for evaluation of pelvic pain. US and MRI images reviewed and discussed with patient. We discussed possible etiologies of her pelvic pain including possible adenomyosis, pelvic floor dysfunction, persistent inflammation. We discussed that if the endometrial lining has grown into the surgical scar and myometrium, the glandular growth may be causing her pain, especially since her pain is related to her menstruation. We reviewed the benefit of menstrual suppression with contraception as a diagnostic and therapeutic tool, for which she will follow up with her Gyn for. We also discussed that it may take a few months before we see effects from contraception and conservative management. Other conservative treatments discussed included warm compresses and continuing with motrin as needed. If no improvement in her pain with contraception and conservative treatment, we discussed a trial of pelvic floor physical therapy. In addition, we reviewed the anticipatory contraction of her pelvic floor. As she had significant pelvic pain, we discussed the role of anticipatory pelvic floor contraction and baseline hypertonicity. We reviewed that pelvic floor dysfunction may be contributing to her current pelvic pain or it may be a result of it. We discussed pelvic floor relaxation techniques and other conservative management.\par \par We also reviewed management options for stress urinary incontinence including: observation, pelvic floor exercises with or without a physical therapist, continence devices or pessaries, periurethral bulking agents, and surgical management. We discussed surgical management options including a midurethral sling, lee colposuspension, and pubovaginal sling using native tissue.  Although her stress incontinence is bothersome, she would like to focus on her pelvic pain first and readdress her MAGALY at a later date.\par \par RTO 3 months for follow-up of pelvic pain and MAGALY.\par

## 2022-11-08 NOTE — PHYSICAL EXAM
[Chaperone Present] : A chaperone was present in the examining room during all aspects of the physical examination [Scar] : a scar was noted [Labia Majora] : were normal [Labia Minora] : were normal [Normal Appearance] : general appearance was normal [No Bleeding] : there was no active vaginal bleeding [Tenderness] : tenderness [Normal rectal exam] : was normal [Normal] : was normal [FreeTextEntry1] : General: Well, appearing. Alert and orientated. No acute distress\par HEENT: Normocephalic, atraumatic and extraocular muscles appear to be intact \par Neck: Full range of motion, no obvious lymphadenopathy, deformities, or masses noted \par Respiratory: Speaking in full sentences comfortably, normal work of breathing and no cough during visit\par Musculoskeletal: active full range of motion in extremities \par Extremities: No upper extremity edema noted\par Skin: no obvious rash or skin lesions\par Neuro: Orientated X 3, speech is fluent, normal rate\par Psych: Normal mood and affect \par   [Distended] : not distended [FreeTextEntry3] : (+) hypermobility, (-) cough stress test  [FreeTextEntry4] : anticipatory contraction of pelvic floor, residual global hypertonicity after relaxation, mild tenderness with palpation [de-identified] : No prolapse

## 2023-02-07 ENCOUNTER — NON-APPOINTMENT (OUTPATIENT)
Age: 33
End: 2023-02-07

## 2023-02-07 ENCOUNTER — APPOINTMENT (OUTPATIENT)
Dept: UROGYNECOLOGY | Facility: CLINIC | Age: 33
End: 2023-02-07
Payer: COMMERCIAL

## 2023-02-07 VITALS
BODY MASS INDEX: 25.16 KG/M2 | HEART RATE: 104 BPM | HEIGHT: 63 IN | OXYGEN SATURATION: 98 % | WEIGHT: 142 LBS | TEMPERATURE: 99 F | DIASTOLIC BLOOD PRESSURE: 79 MMHG | SYSTOLIC BLOOD PRESSURE: 120 MMHG

## 2023-02-07 DIAGNOSIS — R42 DIZZINESS AND GIDDINESS: ICD-10-CM

## 2023-02-07 DIAGNOSIS — N39.3 STRESS INCONTINENCE (FEMALE) (MALE): ICD-10-CM

## 2023-02-07 PROCEDURE — 99213 OFFICE O/P EST LOW 20 MIN: CPT

## 2023-02-07 NOTE — HISTORY OF PRESENT ILLNESS
[Cystocele (Obstetric)] : no [Vaginal Wall Prolapse] : no [Rectal Prolapse] : no [Unable To Restrain Bowel Movement] : no [Urinary Frequency More Than Twice At Night (Nocturia)] : no nocturia [Urinary Frequency] : no [Feelings Of Urinary Urgency] : no [Pain During Urination (Dysuria)] : no [Urinary Tract Infection] : no [Constipation Obstructed Defecation] : no [Stool Visible Blood] : no [Incomplete Emptying Of Stool] : no [Pelvic Pain] : no [Rectal Pain] : no [] : no [de-identified] : Few times a month, improved from last visit  [de-identified] : 8-10x/d [de-identified] : Daily, aching pain [FreeTextEntry1] : \emerson Oswald is a 31yo who presents with pelvic pain after her emergency  delivery. She states that since her last visit on 22, she was started on OCPs. Denies any vaginal bleeding. Her pain has improved slightly. She reports that in December, it became sharper in nature; however, by January, it returned to its previous aching quality. She has also started exercising with some improvement in her pain. She takes motrin as needed. \par \par She also reports a few episodes of dizziness. Denies palpitations, shortness of breath, or loss of consciousness. One episode was associated with vertigo that resolved after lying down for 20 minutes.\par \par Prior imaging included a pelvic US and MRI. Multiple subcentimeter cysts seen along lower uterine segment on MRI and one larger ~2cm cyst. \par \par PMH: None\par PSH: CDx1, ganglion cysts removed from left wrist and right hand\par Soc: Nonsmoker, , employed as  in tech \par \par Daily fluid intake: 1c coffee + 40oz water. 1 seltzer ~1x/wk, 1 glass wine every few weeks.

## 2023-02-07 NOTE — PHYSICAL EXAM
[FreeTextEntry1] : General: Well, appearing. Alert and orientated. No acute distress\par HEENT: Normocephalic, atraumatic and extraocular muscles appear to be intact \par Neck: Full range of motion, no obvious lymphadenopathy, deformities, or masses noted \par Respiratory: Speaking in full sentences comfortably, normal work of breathing and no cough during visit\par Musculoskeletal: full range of motion \par Extremities: No upper extremity edema noted\par Skin: no obvious rash or skin lesions\par Neuro: Orientated X 3, speech is fluent, normal rate\par Psych: Normal mood and affect

## 2023-02-08 ENCOUNTER — NON-APPOINTMENT (OUTPATIENT)
Age: 33
End: 2023-02-08

## 2023-02-09 LAB
BASOPHILS # BLD AUTO: 0.02 K/UL
BASOPHILS NFR BLD AUTO: 0.4 %
EOSINOPHIL # BLD AUTO: 0.06 K/UL
EOSINOPHIL NFR BLD AUTO: 1.2 %
HCT VFR BLD CALC: 44.5 %
HGB BLD-MCNC: 13.7 G/DL
IMM GRANULOCYTES NFR BLD AUTO: 0.2 %
LYMPHOCYTES # BLD AUTO: 1.53 K/UL
LYMPHOCYTES NFR BLD AUTO: 31.3 %
MAN DIFF?: NORMAL
MCHC RBC-ENTMCNC: 27 PG
MCHC RBC-ENTMCNC: 30.8 GM/DL
MCV RBC AUTO: 87.6 FL
MONOCYTES # BLD AUTO: 0.43 K/UL
MONOCYTES NFR BLD AUTO: 8.8 %
NEUTROPHILS # BLD AUTO: 2.84 K/UL
NEUTROPHILS NFR BLD AUTO: 58.1 %
PLATELET # BLD AUTO: 279 K/UL
RBC # BLD: 5.08 M/UL
RBC # FLD: 12.4 %
WBC # FLD AUTO: 4.89 K/UL

## 2023-04-07 ENCOUNTER — NON-APPOINTMENT (OUTPATIENT)
Age: 33
End: 2023-04-07

## 2023-05-05 ENCOUNTER — APPOINTMENT (OUTPATIENT)
Dept: UROGYNECOLOGY | Facility: CLINIC | Age: 33
End: 2023-05-05
Payer: COMMERCIAL

## 2023-05-05 VITALS
HEART RATE: 89 BPM | DIASTOLIC BLOOD PRESSURE: 79 MMHG | OXYGEN SATURATION: 97 % | WEIGHT: 142 LBS | TEMPERATURE: 97.9 F | BODY MASS INDEX: 25.16 KG/M2 | HEIGHT: 63 IN | SYSTOLIC BLOOD PRESSURE: 119 MMHG

## 2023-05-05 DIAGNOSIS — M62.89 OTHER SPECIFIED DISORDERS OF MUSCLE: ICD-10-CM

## 2023-05-05 PROCEDURE — 99213 OFFICE O/P EST LOW 20 MIN: CPT

## 2023-05-05 NOTE — HISTORY OF PRESENT ILLNESS
[Cystocele (Obstetric)] : no [Vaginal Wall Prolapse] : no [Rectal Prolapse] : no [Unable To Restrain Bowel Movement] : no [Urinary Frequency More Than Twice At Night (Nocturia)] : no nocturia [Urinary Frequency] : no [Feelings Of Urinary Urgency] : no [Pain During Urination (Dysuria)] : no [Urinary Tract Infection] : no [Constipation Obstructed Defecation] : no [Stool Visible Blood] : no [Incomplete Emptying Of Stool] : no [Pelvic Pain] : no [Rectal Pain] : no [] : no [de-identified] : Improved from last visit, not bothersome [de-identified] : 8-10x/d [de-identified] : Daily, aching pain [FreeTextEntry1] : \par Margret is a 33yo who presents with abdominal/pelvic pain after her emergency  delivery. Pain is not vaginal. She was started on OCPs back in November and has not had a menstrual period since then. In addition, she has started pelvic floor PT in March. Her pain initially worsened but is now back to its baseline. The pain is still aching in quality and intermittent. It has been a 6.5-7/10 when it comes on and lasts for 1-2 minutes. She takes motrin as needed with minimal relief. Pain not related to urination, defecation, movement. \par \par Prior imaging included a pelvic US and MRI. Multiple subcentimeter cysts seen along lower uterine segment on MRI and one larger ~2cm cyst. \par \par PMH: None\par PSH: CDx1, ganglion cysts removed from left wrist and right hand\par Soc: Nonsmoker, , employed as  in tech \par \par Daily fluid intake: 1c coffee + 40oz water. 1 seltzer ~1x/wk, 1 glass wine every few weeks.

## 2023-05-05 NOTE — DISCUSSION/SUMMARY
[FreeTextEntry1] : \par Margret presents for follow up of abdominal/pelvic pain management. We discussed how we have tried various conservative treatments and physical therapy without significant improvement. We discussed the benefit of consultation with pain management specialists. At this time, Margret is not trying to conceive; however, she would like to have another child in the next 1-2 years with the hopes that her pain will have improved by then. We discussed that her pain should not impact her fertility; however, if it is caused by scar tissue or endometriomas, that surgical intervention could be of benefit. Contact information for Dr Livan Syed given if she decides to pursue surgery. Referral for pain management given. \par \par RTO as needed \par

## 2023-07-12 ENCOUNTER — APPOINTMENT (OUTPATIENT)
Dept: OBGYN | Facility: CLINIC | Age: 33
End: 2023-07-12
Payer: COMMERCIAL

## 2023-07-12 VITALS
WEIGHT: 139 LBS | SYSTOLIC BLOOD PRESSURE: 127 MMHG | HEIGHT: 63 IN | DIASTOLIC BLOOD PRESSURE: 85 MMHG | BODY MASS INDEX: 24.63 KG/M2

## 2023-07-12 DIAGNOSIS — M54.10 RADICULOPATHY, SITE UNSPECIFIED: ICD-10-CM

## 2023-07-12 PROCEDURE — 99214 OFFICE O/P EST MOD 30 MIN: CPT

## 2023-07-16 PROBLEM — M54.10 RADICULAR PAIN: Status: ACTIVE | Noted: 2023-07-16

## 2023-07-16 NOTE — HISTORY OF PRESENT ILLNESS
[FreeTextEntry1] : pt comes for evaluatio of pelvic pains . She has had them for a couple of years . They are pronounced with movement . No dysuria or irregular bleeding . I discussed a diagnostic laparoscopy with utero sacral nerve ablation . I discussed also the posibility of radicular pain .

## 2023-12-18 ENCOUNTER — APPOINTMENT (OUTPATIENT)
Dept: OBGYN | Facility: CLINIC | Age: 33
End: 2023-12-18
Payer: COMMERCIAL

## 2023-12-18 VITALS
WEIGHT: 142 LBS | BODY MASS INDEX: 25.16 KG/M2 | SYSTOLIC BLOOD PRESSURE: 102 MMHG | HEIGHT: 63 IN | DIASTOLIC BLOOD PRESSURE: 66 MMHG

## 2023-12-18 DIAGNOSIS — Z87.59 PERSONAL HISTORY OF OTHER COMPLICATIONS OF PREGNANCY, CHILDBIRTH AND THE PUERPERIUM: ICD-10-CM

## 2023-12-18 DIAGNOSIS — Z01.411 ENCOUNTER FOR GYNECOLOGICAL EXAMINATION (GENERAL) (ROUTINE) WITH ABNORMAL FINDINGS: ICD-10-CM

## 2023-12-18 DIAGNOSIS — R10.2 PELVIC AND PERINEAL PAIN: ICD-10-CM

## 2023-12-18 PROCEDURE — 99395 PREV VISIT EST AGE 18-39: CPT

## 2023-12-18 RX ORDER — NORETHINDRONE ACETATE AND ETHINYL ESTRADIOL, ETHINYL ESTRADIOL AND FERROUS FUMARATE 1MG-10(24)
1 MG-10 MCG / KIT ORAL
Qty: 84 | Refills: 3 | Status: ACTIVE | COMMUNITY
Start: 2022-11-08 | End: 1900-01-01

## 2023-12-22 PROBLEM — Z01.411 ENCOUNTER FOR GYNECOLOGICAL EXAMINATION WITH ABNORMAL FINDING: Status: ACTIVE | Noted: 2021-08-31

## 2023-12-22 PROBLEM — R10.2 PELVIC PAIN: Status: ACTIVE | Noted: 2022-11-08

## 2023-12-22 PROBLEM — Z87.59 HISTORY OF PRE-ECLAMPSIA: Status: RESOLVED | Noted: 2023-12-18 | Resolved: 2023-12-22

## 2023-12-24 LAB — CYTOLOGY CVX/VAG DOC THIN PREP: NORMAL

## 2024-07-26 ENCOUNTER — APPOINTMENT (OUTPATIENT)
Dept: INTERNAL MEDICINE | Facility: CLINIC | Age: 34
End: 2024-07-26

## 2024-09-19 ENCOUNTER — NON-APPOINTMENT (OUTPATIENT)
Age: 34
End: 2024-09-19

## 2024-09-20 ENCOUNTER — APPOINTMENT (OUTPATIENT)
Dept: INTERNAL MEDICINE | Facility: CLINIC | Age: 34
End: 2024-09-20
Payer: COMMERCIAL

## 2024-09-20 VITALS
HEIGHT: 63 IN | DIASTOLIC BLOOD PRESSURE: 76 MMHG | OXYGEN SATURATION: 99 % | BODY MASS INDEX: 24.45 KG/M2 | SYSTOLIC BLOOD PRESSURE: 122 MMHG | WEIGHT: 138 LBS | HEART RATE: 94 BPM

## 2024-09-20 DIAGNOSIS — Z87.898 PERSONAL HISTORY OF OTHER SPECIFIED CONDITIONS: ICD-10-CM

## 2024-09-20 DIAGNOSIS — R42 DIZZINESS AND GIDDINESS: ICD-10-CM

## 2024-09-20 DIAGNOSIS — R10.2 PELVIC AND PERINEAL PAIN: ICD-10-CM

## 2024-09-20 DIAGNOSIS — Z00.00 ENCOUNTER FOR GENERAL ADULT MEDICAL EXAMINATION W/OUT ABNORMAL FINDINGS: ICD-10-CM

## 2024-09-20 DIAGNOSIS — M54.10 RADICULOPATHY, SITE UNSPECIFIED: ICD-10-CM

## 2024-09-20 PROCEDURE — 99385 PREV VISIT NEW AGE 18-39: CPT | Mod: 25

## 2024-09-20 PROCEDURE — 90471 IMMUNIZATION ADMIN: CPT

## 2024-09-20 PROCEDURE — 36415 COLL VENOUS BLD VENIPUNCTURE: CPT

## 2024-09-20 PROCEDURE — 90707 MMR VACCINE SC: CPT

## 2024-09-20 PROCEDURE — 81025 URINE PREGNANCY TEST: CPT

## 2024-09-20 NOTE — HEALTH RISK ASSESSMENT
[No falls in past year] : Patient reported no falls in the past year [0] : 2) Feeling down, depressed, or hopeless: Not at all (0) [PHQ-2 Negative - No further assessment needed] : PHQ-2 Negative - No further assessment needed [With Family] : lives with family [Employed] : employed [] :  [# Of Children ___] : has [unfilled] children [Never] : Never [NO] : No [de-identified] : rare [[id number]] : 0 [Reports changes in hearing] : Reports no changes in hearing [Reports changes in vision] : Reports no changes in vision [Reports changes in dental health] : Reports no changes in dental health [FreeTextEntry2] : Accounting

## 2024-09-20 NOTE — PLAN
[FreeTextEntry1] : Pelvic pain MRI of the pelvis was negative according to the patient.  It is unclear whether these are radicular symptoms.  Will get an lumbar spine x-ray and then determine further management.  Vertigo Resolved.  If it recurs consider vestibular physical therapy  Immunity status testing Titers from her pregnancy reviewed.  She is not immune to measles or varicella. MMR vaccine administered by the LPN after a negative pregnancy test.  No immediate reactions He will go to the pharmacy to have the varicella vaccine  Health maintenance Depression screening negative Blood pressure is controlled Maintain healthy lifestyle with balanced diet and exercise Check labs today, including CBC, CMP, TSH, HbA1c, lipids. Blood collected in office.  Pap smear due later this year Schedule total-body skin exam Follow-up 1 year or as needed based on above

## 2024-09-20 NOTE — PHYSICAL EXAM
[No Acute Distress] : no acute distress [Well Nourished] : well nourished [Well Developed] : well developed [Well-Appearing] : well-appearing [Normal Sclera/Conjunctiva] : normal sclera/conjunctiva [PERRL] : pupils equal round and reactive to light [EOMI] : extraocular movements intact [Normal Outer Ear/Nose] : the outer ears and nose were normal in appearance [Normal Oropharynx] : the oropharynx was normal [No JVD] : no jugular venous distention [No Lymphadenopathy] : no lymphadenopathy [Supple] : supple [Thyroid Normal, No Nodules] : the thyroid was normal and there were no nodules present [No Respiratory Distress] : no respiratory distress  [No Accessory Muscle Use] : no accessory muscle use [Clear to Auscultation] : lungs were clear to auscultation bilaterally [Normal Rate] : normal rate  [Regular Rhythm] : with a regular rhythm [Normal S1, S2] : normal S1 and S2 [No Murmur] : no murmur heard [No Carotid Bruits] : no carotid bruits [No Varicosities] : no varicosities [Pedal Pulses Present] : the pedal pulses are present [No Edema] : there was no peripheral edema [No Extremity Clubbing/Cyanosis] : no extremity clubbing/cyanosis [Soft] : abdomen soft [Non Tender] : non-tender [Non-distended] : non-distended [No HSM] : no HSM [Normal Bowel Sounds] : normal bowel sounds [Normal Posterior Cervical Nodes] : no posterior cervical lymphadenopathy [Normal Anterior Cervical Nodes] : no anterior cervical lymphadenopathy [No CVA Tenderness] : no CVA  tenderness [No Spinal Tenderness] : no spinal tenderness [No Joint Swelling] : no joint swelling [Grossly Normal Strength/Tone] : grossly normal strength/tone [No Rash] : no rash [Coordination Grossly Intact] : coordination grossly intact [No Focal Deficits] : no focal deficits [Normal Gait] : normal gait [Deep Tendon Reflexes (DTR)] : deep tendon reflexes were 2+ and symmetric [Normal Affect] : the affect was normal [Normal Insight/Judgement] : insight and judgment were intact [Normal Appearance] : normal in appearance [No Masses] : no palpable masses [No Nipple Discharge] : no nipple discharge [No Axillary Lymphadenopathy] : no axillary lymphadenopathy [Alert and Oriented x3] : oriented to person, place, and time

## 2024-09-20 NOTE — HISTORY OF PRESENT ILLNESS
[FreeTextEntry1] : Annual physical [de-identified] : 33 y/o female who presents for an annual physical. She had preeclampsia during her pregnancy She has pelvic pain after the birth of her daughter 2.5 years ago. Told she may have incisional endometriosis. She did pelvic floor PT. Another GYN thought it may be low back issues but her insurance denied the L spine MRI The pelvic pain can be sharp and shooting. Other times it is a dull ache. The pain is bilateral. On the bimanual GYN exam when she has  pain on the left side Gets migraines. Tales extra strength Tylenol In May she started to get dizzy. It was extreme for about 1 month. It has resolved. She was told when she was pregnant that she is not immune to measles or chickenpox. exercise: stationary bike, strength diet: mostly healthy. tries to limit sweets  Pap 12/23 Derm: due

## 2024-09-24 ENCOUNTER — TRANSCRIPTION ENCOUNTER (OUTPATIENT)
Age: 34
End: 2024-09-24

## 2024-09-24 LAB
25(OH)D3 SERPL-MCNC: 34.1 NG/ML
ALBUMIN SERPL ELPH-MCNC: 4.6 G/DL
ALP BLD-CCNC: 18 U/L
ALT SERPL-CCNC: 18 U/L
ANION GAP SERPL CALC-SCNC: 13 MMOL/L
APPEARANCE: CLEAR
AST SERPL-CCNC: 20 U/L
BACTERIA: NEGATIVE /HPF
BASOPHILS # BLD AUTO: 0.01 K/UL
BASOPHILS NFR BLD AUTO: 0.2 %
BILIRUB SERPL-MCNC: 0.4 MG/DL
BILIRUBIN URINE: NEGATIVE
BLOOD URINE: NEGATIVE
BUN SERPL-MCNC: 11 MG/DL
CALCIUM SERPL-MCNC: 9.4 MG/DL
CAST: 0 /LPF
CHLORIDE SERPL-SCNC: 106 MMOL/L
CHOLEST SERPL-MCNC: 196 MG/DL
CO2 SERPL-SCNC: 22 MMOL/L
COLOR: YELLOW
CREAT SERPL-MCNC: 0.72 MG/DL
EGFR: 112 ML/MIN/1.73M2
EOSINOPHIL # BLD AUTO: 0.07 K/UL
EOSINOPHIL NFR BLD AUTO: 1.6 %
EPITHELIAL CELLS: 8 /HPF
ESTIMATED AVERAGE GLUCOSE: 103 MG/DL
FERRITIN SERPL-MCNC: 50 NG/ML
GLUCOSE QUALITATIVE U: NEGATIVE MG/DL
GLUCOSE SERPL-MCNC: 94 MG/DL
HBA1C MFR BLD HPLC: 5.2 %
HCG UR QL: NEGATIVE
HCT VFR BLD CALC: 41.2 %
HDLC SERPL-MCNC: 61 MG/DL
HGB BLD-MCNC: 13.1 G/DL
IMM GRANULOCYTES NFR BLD AUTO: 0.5 %
IRON SATN MFR SERPL: 27 %
IRON SERPL-MCNC: 104 UG/DL
KETONES URINE: NEGATIVE MG/DL
LDLC SERPL CALC-MCNC: 122 MG/DL
LEUKOCYTE ESTERASE URINE: ABNORMAL
LYMPHOCYTES # BLD AUTO: 1.54 K/UL
LYMPHOCYTES NFR BLD AUTO: 35.6 %
MAN DIFF?: NORMAL
MCHC RBC-ENTMCNC: 27.2 PG
MCHC RBC-ENTMCNC: 31.8 GM/DL
MCV RBC AUTO: 85.7 FL
MICROSCOPIC-UA: NORMAL
MONOCYTES # BLD AUTO: 0.35 K/UL
MONOCYTES NFR BLD AUTO: 8.1 %
NEUTROPHILS # BLD AUTO: 2.34 K/UL
NEUTROPHILS NFR BLD AUTO: 54 %
NITRITE URINE: NEGATIVE
NONHDLC SERPL-MCNC: 135 MG/DL
PH URINE: 6.5
PLATELET # BLD AUTO: 275 K/UL
POTASSIUM SERPL-SCNC: 4.3 MMOL/L
PROT SERPL-MCNC: 7.6 G/DL
PROTEIN URINE: NEGATIVE MG/DL
RBC # BLD: 4.81 M/UL
RBC # FLD: 12.4 %
RED BLOOD CELLS URINE: 1 /HPF
SODIUM SERPL-SCNC: 141 MMOL/L
SPECIFIC GRAVITY URINE: 1
TIBC SERPL-MCNC: 380 UG/DL
TRIGL SERPL-MCNC: 76 MG/DL
TSH SERPL-ACNC: 1.6 UIU/ML
UIBC SERPL-MCNC: 276 UG/DL
UROBILINOGEN URINE: 0.2 MG/DL
VIT B12 SERPL-MCNC: 491 PG/ML
WBC # FLD AUTO: 4.33 K/UL
WHITE BLOOD CELLS URINE: 1 /HPF

## 2024-09-25 ENCOUNTER — TRANSCRIPTION ENCOUNTER (OUTPATIENT)
Age: 34
End: 2024-09-25

## 2025-09-06 ENCOUNTER — NON-APPOINTMENT (OUTPATIENT)
Age: 35
End: 2025-09-06

## 2025-09-08 ENCOUNTER — APPOINTMENT (OUTPATIENT)
Dept: OBGYN | Facility: CLINIC | Age: 35
End: 2025-09-08
Payer: COMMERCIAL

## 2025-09-08 VITALS
SYSTOLIC BLOOD PRESSURE: 112 MMHG | DIASTOLIC BLOOD PRESSURE: 64 MMHG | HEIGHT: 63 IN | BODY MASS INDEX: 24.98 KG/M2 | WEIGHT: 141 LBS

## 2025-09-08 DIAGNOSIS — Z11.3 ENCOUNTER FOR SCREENING FOR INFECTIONS WITH A PREDOMINANTLY SEXUAL MODE OF TRANSMISSION: ICD-10-CM

## 2025-09-08 DIAGNOSIS — N91.2 AMENORRHEA, UNSPECIFIED: ICD-10-CM

## 2025-09-08 DIAGNOSIS — Z80.6 FAMILY HISTORY OF LEUKEMIA: ICD-10-CM

## 2025-09-08 DIAGNOSIS — Z83.3 FAMILY HISTORY OF DIABETES MELLITUS: ICD-10-CM

## 2025-09-08 DIAGNOSIS — Z01.411 ENCOUNTER FOR GYNECOLOGICAL EXAMINATION (GENERAL) (ROUTINE) WITH ABNORMAL FINDINGS: ICD-10-CM

## 2025-09-08 PROCEDURE — 36415 COLL VENOUS BLD VENIPUNCTURE: CPT

## 2025-09-08 PROCEDURE — 76817 TRANSVAGINAL US OBSTETRIC: CPT

## 2025-09-08 PROCEDURE — 99395 PREV VISIT EST AGE 18-39: CPT

## 2025-09-10 LAB
ABORH: NORMAL
ALBUMIN SERPL ELPH-MCNC: 4.8 G/DL
ALP BLD-CCNC: 19 U/L
ALT SERPL-CCNC: 22 U/L
ANION GAP SERPL CALC-SCNC: 15 MMOL/L
ANTIBODY SCREEN: NORMAL
AST SERPL-CCNC: 19 U/L
B19V IGG SER QL IA: 5.47 INDEX
B19V IGG+IGM SER-IMP: NORMAL
B19V IGG+IGM SER-IMP: POSITIVE
B19V IGM FLD-ACNC: 0.15 INDEX
B19V IGM SER-ACNC: NEGATIVE
BASOPHILS # BLD AUTO: 0.02 K/UL
BASOPHILS NFR BLD AUTO: 0.2 %
BILIRUB DIRECT SERPL-MCNC: 0.11 MG/DL
BILIRUB SERPL-MCNC: 0.4 MG/DL
BUN SERPL-MCNC: 12 MG/DL
C TRACH RRNA SPEC QL NAA+PROBE: NOT DETECTED
CALCIUM SERPL-MCNC: 9.4 MG/DL
CHLORIDE SERPL-SCNC: 101 MMOL/L
CO2 SERPL-SCNC: 21 MMOL/L
CREAT SERPL-MCNC: 0.57 MG/DL
CYTOLOGY CVX/VAG DOC THIN PREP: NORMAL
EGFRCR SERPLBLD CKD-EPI 2021: 121 ML/MIN/1.73M2
EOSINOPHIL # BLD AUTO: 0.08 K/UL
EOSINOPHIL NFR BLD AUTO: 1 %
GLUCOSE SERPL-MCNC: 88 MG/DL
HBV SURFACE AG SER QL: NONREACTIVE
HCT VFR BLD CALC: 37.6 %
HCV AB SER QL: NONREACTIVE
HCV S/CO RATIO: 0.08 S/CO
HGB BLD-MCNC: 12.5 G/DL
HIV1+2 AB SPEC QL IA.RAPID: NONREACTIVE
IMM GRANULOCYTES NFR BLD AUTO: 0.2 %
LYMPHOCYTES # BLD AUTO: 1.93 K/UL
LYMPHOCYTES NFR BLD AUTO: 23.5 %
MAN DIFF?: NORMAL
MCHC RBC-ENTMCNC: 27.6 PG
MCHC RBC-ENTMCNC: 33.2 G/DL
MCV RBC AUTO: 83 FL
MEV IGG FLD QL IA: 51 AU/ML
MEV IGG+IGM SER-IMP: POSITIVE
MONOCYTES # BLD AUTO: 0.6 K/UL
MONOCYTES NFR BLD AUTO: 7.3 %
N GONORRHOEA RRNA SPEC QL NAA+PROBE: NOT DETECTED
NEUTROPHILS # BLD AUTO: 5.56 K/UL
NEUTROPHILS NFR BLD AUTO: 67.8 %
PLATELET # BLD AUTO: 260 K/UL
POTASSIUM SERPL-SCNC: 4 MMOL/L
PROT SERPL-MCNC: 7.7 G/DL
RBC # BLD: 4.53 M/UL
RBC # FLD: 12.5 %
RUBV IGG FLD-ACNC: 13 INDEX
RUBV IGG SER-IMP: POSITIVE
SODIUM SERPL-SCNC: 136 MMOL/L
SOURCE TP AMPLIFICATION: NORMAL
T GONDII AB SER-IMP: NEGATIVE
T GONDII IGG SER QL: <3 IU/ML
T PALLIDUM AB SER QL IA: NEGATIVE
TSH SERPL-ACNC: 2.38 UIU/ML
URATE SERPL-MCNC: 3.2 MG/DL
VZV AB TITR SER: POSITIVE
VZV IGG SER IF-ACNC: 2.49 S/CO
WBC # FLD AUTO: 8.21 K/UL

## 2025-09-13 RX ORDER — PNV NO.95/FERROUS FUM/FOLIC AC 28MG-0.8MG
TABLET ORAL
Refills: 0 | Status: ACTIVE | COMMUNITY